# Patient Record
Sex: MALE | Race: WHITE | NOT HISPANIC OR LATINO | Employment: UNEMPLOYED | URBAN - METROPOLITAN AREA
[De-identification: names, ages, dates, MRNs, and addresses within clinical notes are randomized per-mention and may not be internally consistent; named-entity substitution may affect disease eponyms.]

---

## 2017-02-21 ENCOUNTER — COMMUNICATION - HEALTHEAST (OUTPATIENT)
Dept: FAMILY MEDICINE | Facility: CLINIC | Age: 52
End: 2017-02-21

## 2017-02-21 DIAGNOSIS — F41.1 GENERALIZED ANXIETY DISORDER: ICD-10-CM

## 2017-03-11 ENCOUNTER — COMMUNICATION - HEALTHEAST (OUTPATIENT)
Dept: FAMILY MEDICINE | Facility: CLINIC | Age: 52
End: 2017-03-11

## 2017-03-11 DIAGNOSIS — M54.2 NECK PAIN: ICD-10-CM

## 2017-03-20 ENCOUNTER — OFFICE VISIT - HEALTHEAST (OUTPATIENT)
Dept: FAMILY MEDICINE | Facility: CLINIC | Age: 52
End: 2017-03-20

## 2017-03-20 DIAGNOSIS — G43.909 MIGRAINE: ICD-10-CM

## 2017-03-20 DIAGNOSIS — F41.1 GENERALIZED ANXIETY DISORDER: ICD-10-CM

## 2017-03-20 DIAGNOSIS — M54.2 NECK PAIN: ICD-10-CM

## 2017-03-20 ASSESSMENT — MIFFLIN-ST. JEOR: SCORE: 1628.6

## 2017-03-27 ENCOUNTER — COMMUNICATION - HEALTHEAST (OUTPATIENT)
Dept: FAMILY MEDICINE | Facility: CLINIC | Age: 52
End: 2017-03-27

## 2017-03-27 DIAGNOSIS — F41.1 GENERALIZED ANXIETY DISORDER: ICD-10-CM

## 2017-04-17 ENCOUNTER — COMMUNICATION - HEALTHEAST (OUTPATIENT)
Dept: FAMILY MEDICINE | Facility: CLINIC | Age: 52
End: 2017-04-17

## 2017-04-17 DIAGNOSIS — F41.1 GENERALIZED ANXIETY DISORDER: ICD-10-CM

## 2017-04-17 DIAGNOSIS — R42 DIZZINESS AND GIDDINESS: ICD-10-CM

## 2017-05-01 ENCOUNTER — OFFICE VISIT - HEALTHEAST (OUTPATIENT)
Dept: FAMILY MEDICINE | Facility: CLINIC | Age: 52
End: 2017-05-01

## 2017-05-01 DIAGNOSIS — R42 DIZZINESS AND GIDDINESS: ICD-10-CM

## 2017-05-01 DIAGNOSIS — Z51.81 MEDICATION MONITORING ENCOUNTER: ICD-10-CM

## 2017-05-01 DIAGNOSIS — F41.1 GENERALIZED ANXIETY DISORDER: ICD-10-CM

## 2017-05-01 DIAGNOSIS — G43.909 MIGRAINE: ICD-10-CM

## 2017-05-01 DIAGNOSIS — Z13.6 SCREENING FOR CARDIOVASCULAR CONDITION: ICD-10-CM

## 2017-05-01 ASSESSMENT — MIFFLIN-ST. JEOR: SCORE: 1652.42

## 2017-05-02 LAB
FASTING STATUS PATIENT QL REPORTED: NO
HDLC SERPL-MCNC: 45 MG/DL
LDLC SERPL CALC-MCNC: 146 MG/DL

## 2017-05-03 ENCOUNTER — RECORDS - HEALTHEAST (OUTPATIENT)
Dept: ADMINISTRATIVE | Facility: OTHER | Age: 52
End: 2017-05-03

## 2017-05-10 ENCOUNTER — COMMUNICATION - HEALTHEAST (OUTPATIENT)
Dept: FAMILY MEDICINE | Facility: CLINIC | Age: 52
End: 2017-05-10

## 2017-05-25 ENCOUNTER — RECORDS - HEALTHEAST (OUTPATIENT)
Dept: ADMINISTRATIVE | Facility: OTHER | Age: 52
End: 2017-05-25

## 2017-05-26 ENCOUNTER — COMMUNICATION - HEALTHEAST (OUTPATIENT)
Dept: FAMILY MEDICINE | Facility: CLINIC | Age: 52
End: 2017-05-26

## 2017-05-26 DIAGNOSIS — G43.909 MIGRAINE HEADACHE: ICD-10-CM

## 2017-07-24 ENCOUNTER — RECORDS - HEALTHEAST (OUTPATIENT)
Dept: ADMINISTRATIVE | Facility: OTHER | Age: 52
End: 2017-07-24

## 2017-09-06 ENCOUNTER — COMMUNICATION - HEALTHEAST (OUTPATIENT)
Dept: FAMILY MEDICINE | Facility: CLINIC | Age: 52
End: 2017-09-06

## 2017-09-17 ENCOUNTER — COMMUNICATION - HEALTHEAST (OUTPATIENT)
Dept: FAMILY MEDICINE | Facility: CLINIC | Age: 52
End: 2017-09-17

## 2017-09-17 DIAGNOSIS — F41.1 GENERALIZED ANXIETY DISORDER: ICD-10-CM

## 2017-09-18 ENCOUNTER — RECORDS - HEALTHEAST (OUTPATIENT)
Dept: ADMINISTRATIVE | Facility: OTHER | Age: 52
End: 2017-09-18

## 2017-09-26 ENCOUNTER — COMMUNICATION - HEALTHEAST (OUTPATIENT)
Dept: FAMILY MEDICINE | Facility: CLINIC | Age: 52
End: 2017-09-26

## 2017-09-26 DIAGNOSIS — F41.1 GENERALIZED ANXIETY DISORDER: ICD-10-CM

## 2017-10-29 ENCOUNTER — COMMUNICATION - HEALTHEAST (OUTPATIENT)
Dept: FAMILY MEDICINE | Facility: CLINIC | Age: 52
End: 2017-10-29

## 2017-10-29 DIAGNOSIS — F41.1 GENERALIZED ANXIETY DISORDER: ICD-10-CM

## 2017-10-30 ENCOUNTER — RECORDS - HEALTHEAST (OUTPATIENT)
Dept: ADMINISTRATIVE | Facility: OTHER | Age: 52
End: 2017-10-30

## 2017-11-16 ENCOUNTER — OFFICE VISIT - HEALTHEAST (OUTPATIENT)
Dept: FAMILY MEDICINE | Facility: CLINIC | Age: 52
End: 2017-11-16

## 2017-11-16 DIAGNOSIS — M54.2 NECK PAIN: ICD-10-CM

## 2017-11-16 DIAGNOSIS — G43.909 MIGRAINE: ICD-10-CM

## 2017-11-16 DIAGNOSIS — G43.909 MIGRAINE HEADACHE: ICD-10-CM

## 2017-11-16 DIAGNOSIS — F17.200 TOBACCO USE DISORDER: ICD-10-CM

## 2017-11-16 DIAGNOSIS — F41.1 GENERALIZED ANXIETY DISORDER: ICD-10-CM

## 2017-11-16 DIAGNOSIS — Z76.0 MEDICATION REFILL: ICD-10-CM

## 2017-11-16 DIAGNOSIS — Z23 NEED FOR VACCINATION: ICD-10-CM

## 2017-11-16 DIAGNOSIS — Z79.899 CHRONIC PRESCRIPTION BENZODIAZEPINE USE: ICD-10-CM

## 2017-11-16 ASSESSMENT — MIFFLIN-ST. JEOR: SCORE: 1639.83

## 2017-12-13 ENCOUNTER — HOSPITAL ENCOUNTER (OUTPATIENT)
Dept: PHYSICAL MEDICINE AND REHAB | Facility: CLINIC | Age: 52
Discharge: HOME | End: 2017-12-13
Attending: PHYSICIAN ASSISTANT

## 2017-12-13 DIAGNOSIS — M54.2 NECK PAIN: ICD-10-CM

## 2017-12-13 DIAGNOSIS — M54.50 LUMBAR SPINE PAIN: ICD-10-CM

## 2017-12-13 DIAGNOSIS — M54.6 THORACIC SPINE PAIN: ICD-10-CM

## 2017-12-13 DIAGNOSIS — G43.909 MIGRAINE: ICD-10-CM

## 2018-01-05 ENCOUNTER — COMMUNICATION - HEALTHEAST (OUTPATIENT)
Dept: FAMILY MEDICINE | Facility: CLINIC | Age: 53
End: 2018-01-05

## 2018-01-05 DIAGNOSIS — F41.1 GENERALIZED ANXIETY DISORDER: ICD-10-CM

## 2018-03-01 ENCOUNTER — OFFICE VISIT - HEALTHEAST (OUTPATIENT)
Dept: FAMILY MEDICINE | Facility: CLINIC | Age: 53
End: 2018-03-01

## 2018-03-01 DIAGNOSIS — G43.909 MIGRAINE: ICD-10-CM

## 2018-03-01 DIAGNOSIS — M54.2 CERVICALGIA: ICD-10-CM

## 2018-03-01 DIAGNOSIS — R42 DIZZINESS AND GIDDINESS: ICD-10-CM

## 2018-03-01 DIAGNOSIS — F41.1 GENERALIZED ANXIETY DISORDER: ICD-10-CM

## 2018-03-01 ASSESSMENT — MIFFLIN-ST. JEOR: SCORE: 1647.88

## 2018-03-02 ENCOUNTER — COMMUNICATION - HEALTHEAST (OUTPATIENT)
Dept: FAMILY MEDICINE | Facility: CLINIC | Age: 53
End: 2018-03-02

## 2018-03-06 ENCOUNTER — COMMUNICATION - HEALTHEAST (OUTPATIENT)
Dept: FAMILY MEDICINE | Facility: CLINIC | Age: 53
End: 2018-03-06

## 2018-03-12 ENCOUNTER — COMMUNICATION - HEALTHEAST (OUTPATIENT)
Dept: FAMILY MEDICINE | Facility: CLINIC | Age: 53
End: 2018-03-12

## 2018-04-18 ENCOUNTER — RECORDS - HEALTHEAST (OUTPATIENT)
Dept: ADMINISTRATIVE | Facility: OTHER | Age: 53
End: 2018-04-18

## 2018-05-01 ENCOUNTER — OFFICE VISIT - HEALTHEAST (OUTPATIENT)
Dept: FAMILY MEDICINE | Facility: CLINIC | Age: 53
End: 2018-05-01

## 2018-05-01 DIAGNOSIS — M79.671 FOOT PAIN, BILATERAL: ICD-10-CM

## 2018-05-01 DIAGNOSIS — F41.1 GENERALIZED ANXIETY DISORDER: ICD-10-CM

## 2018-05-01 DIAGNOSIS — R42 DIZZINESS AND GIDDINESS: ICD-10-CM

## 2018-05-01 DIAGNOSIS — F32.9 MAJOR DEPRESSION: ICD-10-CM

## 2018-05-01 DIAGNOSIS — M79.672 FOOT PAIN, BILATERAL: ICD-10-CM

## 2018-05-01 DIAGNOSIS — Z79.899 CONTROLLED SUBSTANCE AGREEMENT SIGNED: ICD-10-CM

## 2018-05-01 ASSESSMENT — MIFFLIN-ST. JEOR: SCORE: 1623.5

## 2018-05-16 ENCOUNTER — COMMUNICATION - HEALTHEAST (OUTPATIENT)
Dept: FAMILY MEDICINE | Facility: CLINIC | Age: 53
End: 2018-05-16

## 2018-05-16 DIAGNOSIS — F41.1 GENERALIZED ANXIETY DISORDER: ICD-10-CM

## 2018-06-07 ENCOUNTER — COMMUNICATION - HEALTHEAST (OUTPATIENT)
Dept: FAMILY MEDICINE | Facility: CLINIC | Age: 53
End: 2018-06-07

## 2018-06-07 DIAGNOSIS — M54.2 NECK PAIN: ICD-10-CM

## 2018-06-27 ENCOUNTER — COMMUNICATION - HEALTHEAST (OUTPATIENT)
Dept: FAMILY MEDICINE | Facility: CLINIC | Age: 53
End: 2018-06-27

## 2018-06-27 DIAGNOSIS — R42 DIZZINESS AND GIDDINESS: ICD-10-CM

## 2018-07-07 ENCOUNTER — COMMUNICATION - HEALTHEAST (OUTPATIENT)
Dept: SCHEDULING | Facility: CLINIC | Age: 53
End: 2018-07-07

## 2018-07-13 ENCOUNTER — COMMUNICATION - HEALTHEAST (OUTPATIENT)
Dept: FAMILY MEDICINE | Facility: CLINIC | Age: 53
End: 2018-07-13

## 2018-07-25 ENCOUNTER — COMMUNICATION - HEALTHEAST (OUTPATIENT)
Dept: FAMILY MEDICINE | Facility: CLINIC | Age: 53
End: 2018-07-25

## 2018-07-25 DIAGNOSIS — G43.909 MIGRAINE: ICD-10-CM

## 2018-07-31 ENCOUNTER — OFFICE VISIT - HEALTHEAST (OUTPATIENT)
Dept: AUDIOLOGY | Facility: CLINIC | Age: 53
End: 2018-07-31

## 2018-07-31 ENCOUNTER — OFFICE VISIT - HEALTHEAST (OUTPATIENT)
Dept: OTOLARYNGOLOGY | Facility: CLINIC | Age: 53
End: 2018-07-31

## 2018-07-31 DIAGNOSIS — R42 DIZZINESS: ICD-10-CM

## 2018-07-31 DIAGNOSIS — H92.03 OTALGIA OF BOTH EARS: ICD-10-CM

## 2018-07-31 DIAGNOSIS — R42 DIZZINESS AND GIDDINESS: ICD-10-CM

## 2018-07-31 DIAGNOSIS — H90.3 SENSORINEURAL HEARING LOSS, ASYMMETRICAL: ICD-10-CM

## 2018-08-12 ENCOUNTER — COMMUNICATION - HEALTHEAST (OUTPATIENT)
Dept: FAMILY MEDICINE | Facility: CLINIC | Age: 53
End: 2018-08-12

## 2018-08-12 DIAGNOSIS — F41.1 GENERALIZED ANXIETY DISORDER: ICD-10-CM

## 2018-09-07 ENCOUNTER — OFFICE VISIT - HEALTHEAST (OUTPATIENT)
Dept: FAMILY MEDICINE | Facility: CLINIC | Age: 53
End: 2018-09-07

## 2018-09-07 DIAGNOSIS — F41.1 GENERALIZED ANXIETY DISORDER: ICD-10-CM

## 2018-09-07 DIAGNOSIS — H81.8X9 PERSISTENT POSTURAL-PERCEPTUAL DIZZINESS: ICD-10-CM

## 2018-09-07 DIAGNOSIS — F32.9 MAJOR DEPRESSION: ICD-10-CM

## 2018-09-07 ASSESSMENT — MIFFLIN-ST. JEOR: SCORE: 1618.96

## 2018-09-21 ENCOUNTER — COMMUNICATION - HEALTHEAST (OUTPATIENT)
Dept: ADMINISTRATIVE | Facility: CLINIC | Age: 53
End: 2018-09-21

## 2018-10-03 ENCOUNTER — OFFICE VISIT - HEALTHEAST (OUTPATIENT)
Dept: PODIATRY | Facility: CLINIC | Age: 53
End: 2018-10-03

## 2018-10-03 ENCOUNTER — AMBULATORY - HEALTHEAST (OUTPATIENT)
Dept: OTHER | Facility: CLINIC | Age: 53
End: 2018-10-03

## 2018-10-03 DIAGNOSIS — M21.6X1 PRONATION DEFORMITY OF BOTH FEET: ICD-10-CM

## 2018-10-03 DIAGNOSIS — M21.6X2 PRONATION DEFORMITY OF BOTH FEET: ICD-10-CM

## 2018-10-28 ENCOUNTER — COMMUNICATION - HEALTHEAST (OUTPATIENT)
Dept: FAMILY MEDICINE | Facility: CLINIC | Age: 53
End: 2018-10-28

## 2018-10-28 DIAGNOSIS — F41.1 GENERALIZED ANXIETY DISORDER: ICD-10-CM

## 2019-01-22 ENCOUNTER — COMMUNICATION - HEALTHEAST (OUTPATIENT)
Dept: FAMILY MEDICINE | Facility: CLINIC | Age: 54
End: 2019-01-22

## 2019-01-23 ENCOUNTER — COMMUNICATION - HEALTHEAST (OUTPATIENT)
Dept: FAMILY MEDICINE | Facility: CLINIC | Age: 54
End: 2019-01-23

## 2019-01-31 ENCOUNTER — COMMUNICATION - HEALTHEAST (OUTPATIENT)
Dept: FAMILY MEDICINE | Facility: CLINIC | Age: 54
End: 2019-01-31

## 2019-02-01 ENCOUNTER — COMMUNICATION - HEALTHEAST (OUTPATIENT)
Dept: FAMILY MEDICINE | Facility: CLINIC | Age: 54
End: 2019-02-01

## 2019-02-20 ENCOUNTER — COMMUNICATION - HEALTHEAST (OUTPATIENT)
Dept: FAMILY MEDICINE | Facility: CLINIC | Age: 54
End: 2019-02-20

## 2019-02-25 ENCOUNTER — COMMUNICATION - HEALTHEAST (OUTPATIENT)
Dept: FAMILY MEDICINE | Facility: CLINIC | Age: 54
End: 2019-02-25

## 2019-03-06 ENCOUNTER — COMMUNICATION - HEALTHEAST (OUTPATIENT)
Dept: FAMILY MEDICINE | Facility: CLINIC | Age: 54
End: 2019-03-06

## 2019-03-06 DIAGNOSIS — M54.2 NECK PAIN: ICD-10-CM

## 2019-03-22 ENCOUNTER — OFFICE VISIT - HEALTHEAST (OUTPATIENT)
Dept: FAMILY MEDICINE | Facility: CLINIC | Age: 54
End: 2019-03-22

## 2019-03-22 DIAGNOSIS — H81.8X9 PERSISTENT POSTURAL-PERCEPTUAL DIZZINESS: ICD-10-CM

## 2019-03-22 DIAGNOSIS — F41.1 GENERALIZED ANXIETY DISORDER: ICD-10-CM

## 2019-03-22 DIAGNOSIS — M54.2 NECK PAIN: ICD-10-CM

## 2019-03-22 DIAGNOSIS — F17.200 TOBACCO USE DISORDER: ICD-10-CM

## 2019-03-22 DIAGNOSIS — G43.909 MIGRAINE WITHOUT STATUS MIGRAINOSUS, NOT INTRACTABLE, UNSPECIFIED MIGRAINE TYPE: ICD-10-CM

## 2019-03-22 DIAGNOSIS — M25.511 CHRONIC RIGHT SHOULDER PAIN: ICD-10-CM

## 2019-03-22 DIAGNOSIS — G89.29 CHRONIC RIGHT SHOULDER PAIN: ICD-10-CM

## 2019-03-22 ASSESSMENT — MIFFLIN-ST. JEOR: SCORE: 1659.79

## 2019-03-24 ENCOUNTER — COMMUNICATION - HEALTHEAST (OUTPATIENT)
Dept: FAMILY MEDICINE | Facility: CLINIC | Age: 54
End: 2019-03-24

## 2019-03-24 DIAGNOSIS — F41.1 GENERALIZED ANXIETY DISORDER: ICD-10-CM

## 2019-05-31 ENCOUNTER — COMMUNICATION - HEALTHEAST (OUTPATIENT)
Dept: FAMILY MEDICINE | Facility: CLINIC | Age: 54
End: 2019-05-31

## 2019-08-08 ENCOUNTER — OFFICE VISIT - HEALTHEAST (OUTPATIENT)
Dept: FAMILY MEDICINE | Facility: CLINIC | Age: 54
End: 2019-08-08

## 2019-08-08 DIAGNOSIS — F10.11 HISTORY OF ALCOHOL ABUSE: ICD-10-CM

## 2019-08-08 DIAGNOSIS — H81.8X9 PERSISTENT POSTURAL-PERCEPTUAL DIZZINESS: ICD-10-CM

## 2019-08-08 DIAGNOSIS — F41.9 ANXIETY: ICD-10-CM

## 2019-08-08 ASSESSMENT — MIFFLIN-ST. JEOR: SCORE: 1682.47

## 2019-09-05 ENCOUNTER — COMMUNICATION - HEALTHEAST (OUTPATIENT)
Dept: FAMILY MEDICINE | Facility: CLINIC | Age: 54
End: 2019-09-05

## 2019-09-05 DIAGNOSIS — F41.1 GENERALIZED ANXIETY DISORDER: ICD-10-CM

## 2019-09-09 ENCOUNTER — OFFICE VISIT - HEALTHEAST (OUTPATIENT)
Dept: FAMILY MEDICINE | Facility: CLINIC | Age: 54
End: 2019-09-09

## 2019-09-09 DIAGNOSIS — F33.41 RECURRENT MAJOR DEPRESSIVE DISORDER, IN PARTIAL REMISSION (H): ICD-10-CM

## 2019-09-09 DIAGNOSIS — H81.8X9 PERSISTENT POSTURAL-PERCEPTUAL DIZZINESS: ICD-10-CM

## 2019-09-09 DIAGNOSIS — F41.1 GENERALIZED ANXIETY DISORDER: ICD-10-CM

## 2019-09-09 DIAGNOSIS — F10.21 ALCOHOL DEPENDENCE IN REMISSION (H): ICD-10-CM

## 2019-09-09 DIAGNOSIS — G43.909 MIGRAINE WITHOUT STATUS MIGRAINOSUS, NOT INTRACTABLE, UNSPECIFIED MIGRAINE TYPE: ICD-10-CM

## 2019-10-03 ENCOUNTER — COMMUNICATION - HEALTHEAST (OUTPATIENT)
Dept: FAMILY MEDICINE | Facility: CLINIC | Age: 54
End: 2019-10-03

## 2019-10-03 DIAGNOSIS — F41.1 GENERALIZED ANXIETY DISORDER: ICD-10-CM

## 2019-10-03 DIAGNOSIS — M54.2 NECK PAIN: ICD-10-CM

## 2019-10-03 DIAGNOSIS — H81.8X9 PERSISTENT POSTURAL-PERCEPTUAL DIZZINESS: ICD-10-CM

## 2019-11-26 ENCOUNTER — COMMUNICATION - HEALTHEAST (OUTPATIENT)
Dept: FAMILY MEDICINE | Facility: CLINIC | Age: 54
End: 2019-11-26

## 2019-11-26 DIAGNOSIS — F41.1 GENERALIZED ANXIETY DISORDER: ICD-10-CM

## 2019-12-10 ENCOUNTER — OFFICE VISIT - HEALTHEAST (OUTPATIENT)
Dept: FAMILY MEDICINE | Facility: CLINIC | Age: 54
End: 2019-12-10

## 2019-12-10 DIAGNOSIS — F41.1 GENERALIZED ANXIETY DISORDER: ICD-10-CM

## 2019-12-10 DIAGNOSIS — R41.3 MEMORY LOSS: ICD-10-CM

## 2019-12-10 DIAGNOSIS — G43.909 MIGRAINE WITHOUT STATUS MIGRAINOSUS, NOT INTRACTABLE, UNSPECIFIED MIGRAINE TYPE: ICD-10-CM

## 2019-12-10 DIAGNOSIS — H81.8X9 PERSISTENT POSTURAL-PERCEPTUAL DIZZINESS: ICD-10-CM

## 2019-12-10 ASSESSMENT — PATIENT HEALTH QUESTIONNAIRE - PHQ9: SUM OF ALL RESPONSES TO PHQ QUESTIONS 1-9: 6

## 2019-12-10 ASSESSMENT — MIFFLIN-ST. JEOR: SCORE: 1702.03

## 2019-12-17 ENCOUNTER — COMMUNICATION - HEALTHEAST (OUTPATIENT)
Dept: FAMILY MEDICINE | Facility: CLINIC | Age: 54
End: 2019-12-17

## 2020-01-23 ENCOUNTER — COMMUNICATION - HEALTHEAST (OUTPATIENT)
Dept: FAMILY MEDICINE | Facility: CLINIC | Age: 55
End: 2020-01-23

## 2020-01-23 DIAGNOSIS — F41.1 GENERALIZED ANXIETY DISORDER: ICD-10-CM

## 2020-01-23 DIAGNOSIS — H81.8X9 PERSISTENT POSTURAL-PERCEPTUAL DIZZINESS: ICD-10-CM

## 2020-02-03 ENCOUNTER — COMMUNICATION - HEALTHEAST (OUTPATIENT)
Dept: FAMILY MEDICINE | Facility: CLINIC | Age: 55
End: 2020-02-03

## 2020-02-03 DIAGNOSIS — H81.8X9 PERSISTENT POSTURAL-PERCEPTUAL DIZZINESS: ICD-10-CM

## 2020-02-03 DIAGNOSIS — F41.1 GENERALIZED ANXIETY DISORDER: ICD-10-CM

## 2020-05-18 ENCOUNTER — COMMUNICATION - HEALTHEAST (OUTPATIENT)
Dept: FAMILY MEDICINE | Facility: CLINIC | Age: 55
End: 2020-05-18

## 2020-05-18 DIAGNOSIS — F41.1 GENERALIZED ANXIETY DISORDER: ICD-10-CM

## 2020-06-17 ENCOUNTER — COMMUNICATION - HEALTHEAST (OUTPATIENT)
Dept: FAMILY MEDICINE | Facility: CLINIC | Age: 55
End: 2020-06-17

## 2020-06-17 DIAGNOSIS — M54.2 NECK PAIN: ICD-10-CM

## 2020-08-17 ENCOUNTER — COMMUNICATION - HEALTHEAST (OUTPATIENT)
Dept: FAMILY MEDICINE | Facility: CLINIC | Age: 55
End: 2020-08-17

## 2020-08-17 DIAGNOSIS — M54.2 NECK PAIN: ICD-10-CM

## 2020-09-01 ENCOUNTER — COMMUNICATION - HEALTHEAST (OUTPATIENT)
Dept: FAMILY MEDICINE | Facility: CLINIC | Age: 55
End: 2020-09-01

## 2020-09-01 DIAGNOSIS — F41.1 GENERALIZED ANXIETY DISORDER: ICD-10-CM

## 2020-10-24 ENCOUNTER — COMMUNICATION - HEALTHEAST (OUTPATIENT)
Dept: FAMILY MEDICINE | Facility: CLINIC | Age: 55
End: 2020-10-24

## 2020-10-24 DIAGNOSIS — M54.2 NECK PAIN: ICD-10-CM

## 2020-12-02 ENCOUNTER — COMMUNICATION - HEALTHEAST (OUTPATIENT)
Dept: FAMILY MEDICINE | Facility: CLINIC | Age: 55
End: 2020-12-02

## 2020-12-02 DIAGNOSIS — F41.1 GENERALIZED ANXIETY DISORDER: ICD-10-CM

## 2020-12-30 ENCOUNTER — COMMUNICATION - HEALTHEAST (OUTPATIENT)
Dept: FAMILY MEDICINE | Facility: CLINIC | Age: 55
End: 2020-12-30

## 2020-12-30 DIAGNOSIS — M54.2 NECK PAIN: ICD-10-CM

## 2021-03-04 ENCOUNTER — COMMUNICATION - HEALTHEAST (OUTPATIENT)
Dept: FAMILY MEDICINE | Facility: CLINIC | Age: 56
End: 2021-03-04

## 2021-03-04 DIAGNOSIS — F41.1 GENERALIZED ANXIETY DISORDER: ICD-10-CM

## 2021-03-12 ENCOUNTER — COMMUNICATION - HEALTHEAST (OUTPATIENT)
Dept: FAMILY MEDICINE | Facility: CLINIC | Age: 56
End: 2021-03-12

## 2021-03-12 DIAGNOSIS — M54.2 NECK PAIN: ICD-10-CM

## 2021-03-12 DIAGNOSIS — F41.1 GENERALIZED ANXIETY DISORDER: ICD-10-CM

## 2021-04-02 ENCOUNTER — COMMUNICATION - HEALTHEAST (OUTPATIENT)
Dept: FAMILY MEDICINE | Facility: CLINIC | Age: 56
End: 2021-04-02

## 2021-04-02 DIAGNOSIS — F41.1 GENERALIZED ANXIETY DISORDER: ICD-10-CM

## 2021-04-26 ENCOUNTER — OFFICE VISIT - HEALTHEAST (OUTPATIENT)
Dept: FAMILY MEDICINE | Facility: CLINIC | Age: 56
End: 2021-04-26

## 2021-04-26 DIAGNOSIS — Z13.1 SCREENING FOR DIABETES MELLITUS: ICD-10-CM

## 2021-04-26 DIAGNOSIS — F41.1 GENERALIZED ANXIETY DISORDER: ICD-10-CM

## 2021-04-26 DIAGNOSIS — G43.909 MIGRAINE WITHOUT STATUS MIGRAINOSUS, NOT INTRACTABLE, UNSPECIFIED MIGRAINE TYPE: ICD-10-CM

## 2021-04-26 DIAGNOSIS — Z13.6 SCREENING FOR CARDIOVASCULAR CONDITION: ICD-10-CM

## 2021-04-26 DIAGNOSIS — H81.8X9 PERSISTENT POSTURAL-PERCEPTUAL DIZZINESS: ICD-10-CM

## 2021-04-26 DIAGNOSIS — R42 DIZZINESS AND GIDDINESS: ICD-10-CM

## 2021-04-26 DIAGNOSIS — Z12.11 SCREEN FOR COLON CANCER: ICD-10-CM

## 2021-04-26 ASSESSMENT — ANXIETY QUESTIONNAIRES
1. FEELING NERVOUS, ANXIOUS, OR ON EDGE: MORE THAN HALF THE DAYS
IF YOU CHECKED OFF ANY PROBLEMS ON THIS QUESTIONNAIRE, HOW DIFFICULT HAVE THESE PROBLEMS MADE IT FOR YOU TO DO YOUR WORK, TAKE CARE OF THINGS AT HOME, OR GET ALONG WITH OTHER PEOPLE: SOMEWHAT DIFFICULT
3. WORRYING TOO MUCH ABOUT DIFFERENT THINGS: MORE THAN HALF THE DAYS
2. NOT BEING ABLE TO STOP OR CONTROL WORRYING: NEARLY EVERY DAY
5. BEING SO RESTLESS THAT IT IS HARD TO SIT STILL: NOT AT ALL
4. TROUBLE RELAXING: NOT AT ALL
GAD7 TOTAL SCORE: 7
7. FEELING AFRAID AS IF SOMETHING AWFUL MIGHT HAPPEN: NOT AT ALL
6. BECOMING EASILY ANNOYED OR IRRITABLE: NOT AT ALL

## 2021-04-26 ASSESSMENT — MIFFLIN-ST. JEOR: SCORE: 1682.47

## 2021-04-26 ASSESSMENT — PATIENT HEALTH QUESTIONNAIRE - PHQ9: SUM OF ALL RESPONSES TO PHQ QUESTIONS 1-9: 8

## 2021-05-13 ENCOUNTER — COMMUNICATION - HEALTHEAST (OUTPATIENT)
Dept: FAMILY MEDICINE | Facility: CLINIC | Age: 56
End: 2021-05-13

## 2021-05-13 DIAGNOSIS — F41.1 GENERALIZED ANXIETY DISORDER: ICD-10-CM

## 2021-05-14 RX ORDER — ESCITALOPRAM OXALATE 20 MG/1
20 TABLET ORAL DAILY
Qty: 90 TABLET | Refills: 3 | Status: SHIPPED | OUTPATIENT
Start: 2021-05-14 | End: 2022-05-20

## 2021-05-26 ASSESSMENT — PATIENT HEALTH QUESTIONNAIRE - PHQ9: SUM OF ALL RESPONSES TO PHQ QUESTIONS 1-9: 6
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
## 2021-05-27 ASSESSMENT — PATIENT HEALTH QUESTIONNAIRE - PHQ9: SUM OF ALL RESPONSES TO PHQ QUESTIONS 1-9: 8

## 2021-05-27 NOTE — TELEPHONE ENCOUNTER
Refill Approved    Rx renewed per Medication Renewal Policy. Medication was last renewed on 1/31/19.    Kasey Sanders, [facility] Triage/Med Refill 3/25/2019     Requested Prescriptions   Pending Prescriptions Disp Refills     hydrOXYzine HCl (ATARAX) 25 MG tablet [Pharmacy Med Name: HYDROXYZINE HCL 25MG TABS (WHITE)] 90 tablet 0     Sig: TAKE 1 TABLET BY MOUTH THREE TIMES DAILY AS NEEDED FOR ANXIETY    Antihistamine Refill Protocol Passed - 3/24/2019  3:59 PM       Passed - Patient has had office visit/physical in last year    Last office visit with prescriber/PCP: 3/22/2019 Priya Caal MD OR same dept: 3/22/2019 Priya Caal MD OR same specialty: 3/22/2019 Priya Caal MD  Last physical: Visit date not found Last MTM visit: Visit date not found   Next visit within 3 mo: Visit date not found  Next physical within 3 mo: Visit date not found  Prescriber OR PCP: Priya Caal MD  Last diagnosis associated with med order: There are no diagnoses linked to this encounter.  If protocol passes may refill for 12 months if within 3 months of last provider visit (or a total of 15 months).

## 2021-05-28 ASSESSMENT — ANXIETY QUESTIONNAIRES: GAD7 TOTAL SCORE: 7

## 2021-05-29 NOTE — TELEPHONE ENCOUNTER
New Appointment Needed  What is the reason for the visit:    Same Date/Next Day Appt Request  What is the reason for your visit?: Increase dizziness.    Provider Preference: PCP only  How soon do you need to be seen?: today, would like to use the 2:00 opening. Patients appointments are set for 40 min.  Waitlist offered?: No  Okay to leave a detailed message:  Yes

## 2021-05-30 VITALS — HEIGHT: 68 IN | WEIGHT: 180.13 LBS | BODY MASS INDEX: 27.3 KG/M2

## 2021-05-30 VITALS — WEIGHT: 185.38 LBS | HEIGHT: 68 IN | BODY MASS INDEX: 28.1 KG/M2

## 2021-05-31 ENCOUNTER — RECORDS - HEALTHEAST (OUTPATIENT)
Dept: ADMINISTRATIVE | Facility: CLINIC | Age: 56
End: 2021-05-31

## 2021-05-31 VITALS — WEIGHT: 174 LBS | BODY MASS INDEX: 26.85 KG/M2

## 2021-05-31 VITALS — BODY MASS INDEX: 27.68 KG/M2 | WEIGHT: 182.6 LBS | HEIGHT: 68 IN

## 2021-05-31 NOTE — PROGRESS NOTES
"Chief Complaint   Patient presents with     Paperwork     He states that he no longer qualifies for FMLA so he needs an intermittent leave of absence sheet filled out.      HPI: Patient presents today requesting to have paperwork filled out for intermittent leave.  He previously has had FMLA, but given that he is cut down his hours, he needs new paperwork filled out.  He has known history of persistent pustular perceptual dizziness diagnosed at the [facility].  For the symptoms he has had brain imaging, physical therapy, and I cognitive therapy.  He may be interested in going to the [facility].    Patient has a history of alcohol abuse and anxiety.  In the past he was using alprazolam 3 times daily dosing which helped to \" calm down his brain\".  When he went to treatment for alcohol abuse earlier this year they said he had to stop all of his benzodiazepines so he has been without for the last several months.  He says that this is difficult for him despite continuing with Lexapro and hydroxyzine.  He wonders if he can restart alprazolam.  He says he feels more anxious and more \"jumpy\".    ELISA-7 Total: 13 (9/7/2018  5:00 PM)  ELISA 7 Total Score: 4 (8/8/2019  4:00 PM)  PHQ-9 Total Score: 12 (8/8/2019  4:00 PM)          ROS:Review of Systems - negative except for what's listed in the HPI    SH: The Patient's  reports that he has been smoking cigarettes.  He has a 30.00 pack-year smoking history. He has never used smokeless tobacco. He reports that he does not drink alcohol or use drugs.      FH: The Patient's family history includes No Medical Problems in his brother, father, mother, and sister.     Meds:    Current Outpatient Medications on File Prior to Visit   Medication Sig Dispense Refill     escitalopram oxalate (LEXAPRO) 20 MG tablet Take 1 tablet (20 mg total) by mouth daily. 90 tablet 3     hydrOXYzine HCl (ATARAX) 25 MG tablet TAKE 1 TABLET BY MOUTH THREE TIMES DAILY AS NEEDED FOR ANXIETY 90 " "tablet 6     methocarbamol (ROBAXIN) 750 MG tablet Take 1 tablet (750 mg total) by mouth 3 (three) times a day as needed. 90 tablet 5     No current facility-administered medications on file prior to visit.        O:  /86   Pulse 72   Temp 98.5  F (36.9  C) (Oral)   Ht 5' 7.5\" (1.715 m)   Wt 192 lb (87.1 kg)   SpO2 97%   BMI 29.63 kg/m      Physical Examination:   General appearance - alert, well appearing, and in no distress  Mental status - alert, oriented to person, place, and time  Eyes - pupils equal and reactive, extraocular eye movements intact  Ears - bilateral TM's and external ear canals normal  Mouth - mucous membranes moist, pharynx normal without lesions  Neck - supple, no significant adenopathy  Lymphatics - no palpable lymphadenopathy, no hepatosplenomegaly  Chest - clear to auscultation, no wheezes, rales or rhonchi, symmetric air entry  Heart - normal rate and regular rhythm, S1 and S2 normal, no murmurs noted  Neurological - alert, oriented, normal speech, no focal findings or movement disorder noted, neck supple without rigidity, cranial nerves II through XII intact, motor and sensory grossly normal bilaterally, normal muscle tone, no tremors, strength 5/5  Extremities - peripheral pulses normal, no pedal edema, no clubbing or cyanosis  Skin - normal coloration and turgor, no rashes, no suspicious skin lesions noted      A/P:     Problem List Items Addressed This Visit     Persistent postural-perceptual dizziness - Primary      Other Visit Diagnoses     Anxiety        History of alcohol abuse                1. Persistent postural-perceptual dizziness  Paperwork completed today.    2. Anxiety  Discussed different treatment options for anxiety.  I am hesitant to restart high-dose alprazolam like he previously had been on.  We did discuss the possibility of psychiatric consultation and management.  He may be interested in following up with his PCP to discuss restarting.  Will defer " decision to that.    3. History of alcohol abuse  Congratulated on abstinence from alcohol.        Joe Yang, CNP

## 2021-05-31 NOTE — PATIENT INSTRUCTIONS - HE
We've faxed your paperwork out to the employer.    That appointment with Dr. Caal in scheduled in September to address re-starting the xanax.    Thank you for coming in today!    If you receive a survey from [employer] about your experience today, it would be very helpful if you could fill it out to let us know what went well and what we can improve!    General Information:    Today you had your appointment with Joe Yang NP    My hours are:    Monday : Out of clinic  Tuesday : 8:00AM - 5:00 PM  Wednesday: 8:00AM - 5:00 PM  Thursday: 8:00AM - 5:00 PM  Friday: 8:00AM - 5:00 PM    I am not in the office Mondays. Therefore non-urgent calls and medical messages received on Monday will be addressed when I am back in the office on Tuesday. Urgent matters will be reviewed and addressed by one of my partners in the office as needed.    If lab work was done today as part of your evaluation you will generally be contacted via impokt, mail, or phone with the results within 1-5 days. If there is an alarming result we will contact you by phone. Lab results come back at varying times, I generally wait until all lab results are available before making comments on the results.     If you need refills please contact your pharmacy. They will send a refill request to me to review. Please allow 3-5 business days for us to process all refill requests.     My Clinical Assistant is Maria Isabel. Please call us at [phone] or send a medical message with any questions or concerns.

## 2021-06-01 VITALS — HEIGHT: 68 IN | WEIGHT: 178 LBS | BODY MASS INDEX: 26.98 KG/M2

## 2021-06-01 VITALS — BODY MASS INDEX: 27.94 KG/M2 | WEIGHT: 184.38 LBS | HEIGHT: 68 IN

## 2021-06-01 VITALS — BODY MASS INDEX: 27.13 KG/M2 | WEIGHT: 179 LBS | HEIGHT: 68 IN

## 2021-06-01 NOTE — TELEPHONE ENCOUNTER
Refill Approved    Rx renewed per Medication Renewal Policy. Medication was last renewed on 8/13/2018.  Last OV 8/8/2019.    Laine Melgoza, [facility] Connection Triage/Med Refill 9/5/2019     Requested Prescriptions   Pending Prescriptions Disp Refills     escitalopram oxalate (LEXAPRO) 20 MG tablet [Pharmacy Med Name: ESCITALOPRAM 20MG TABLETS] 90 tablet 0     Sig: TAKE 1 TABLET(20 MG) BY MOUTH DAILY       SSRI Refill Protocol  Passed - 9/5/2019  3:26 AM        Passed - PCP or prescribing provider visit in last year     Last office visit with prescriber/PCP: 3/22/2019 Priya Caal MD OR same dept: 8/8/2019 Joe Yang CNP OR same specialty: 8/8/2019 Joe Yang CNP  Last physical: Visit date not found Last MTM visit: Visit date not found   Next visit within 3 mo: Visit date not found  Next physical within 3 mo: Visit date not found  Prescriber OR PCP: Priya Caal MD  Last diagnosis associated with med order: 1. Generalized anxiety disorder  - escitalopram oxalate (LEXAPRO) 20 MG tablet [Pharmacy Med Name: ESCITALOPRAM 20MG TABLETS]; TAKE 1 TABLET(20 MG) BY MOUTH DAILY  Dispense: 90 tablet; Refill: 0    If protocol passes may refill for 12 months if within 3 months of last provider visit (or a total of 15 months).

## 2021-06-01 NOTE — PROGRESS NOTES
OV  9/9/2019  Assessment:         1. Generalized anxiety disorder  clonazePAM (KLONOPIN) 0.5 MG tablet   2. Persistent postural-perceptual dizziness  clonazePAM (KLONOPIN) 0.5 MG tablet   3. Recurrent major depressive disorder, in partial remission (H)     4. Alcohol dependence in remission (H)     5. Migraine without status migrainosus, not intractable, unspecified migraine type            Plan:          We reviewed the etiology and natural history for depression/anxiety and options for treatment. We elected to begin a trial of intermittent clonazepam. We reviewed the potential side effects, and I explained the importance of dosing no more than a few times weekly, I imagine at the start of one of his dizzy episodes. We discussed the potential for abuse or harm from misuse from benzodiazepines in general especially with alcohol abuse history, and he expressed understanding. We will discuss a treatment agreement at his follow up visit in 1 month.He will otherwise continue his lexapro and methocarbamol as directed. He will continue with the hydroxyzine as needed as well. He will let me know if he has any significant problems or concerns. He should f/u in 1 mos for recheck, then every 4-6 mos following that.     Subjective:           Larry Denson is a 53 y.o. male who presents for follow up of anxiety, persistent postural-perceptual dizziness, migraine headaches and history of alcohol dependence. He had been on lexapro, topamax and alprazolam for years, but began using alcohol nightly in secret a couple years ago and ultimately went to detox, then inpatient, and outpatient treatment, and has been sober since. He has had to cut back his work hours since he completed treatment, working M-W-F to allow adequate rest between shifts. He takes hydroxyzine 25 mg three times a day for anxiety, but continues to have intermittent episodes of dizziness that can last 3-4 days. He's also been taking muscle relaxants for persistent  neck pain and continues to have headaches 3x weekly. Tylenol seems to help with the headaches at this time. He denies over panic attacks, but feels like he is always on edge, very jumpy and startles easily. He reports that he feels poorly in general and foggy frequently. He can't read or watch TV due to his ongoing dizziness. He wonders about getting back on a medication like alprazolam again, at least intermittently. He feels worse with increased physical activity, and does occasionally need to miss work or go in later due to the dizziness.          Current treatment for depression: Medication: escitalopram. He complains of the following side effects from the treatment: none.       The following portions of the patient's history were reviewed and updated as appropriate: allergies, current medications, past family history, past medical history, past social history, past surgical history and problem list.    Review of Systems  A 12 point comprehensive review of systems was negative except as noted.        Objective:     Vitals:    09/09/19 1458   BP: 106/74   Patient Position: Sitting   Cuff Size: Adult Large   Pulse: 82   SpO2: 97%   Weight: 196 lb 2 oz (89 kg)      Body mass index is 30.26 kg/m .   Physical Exam:  GEN: Alert and oriented, NAD,  well nourished  SKIN:  Normal skin turgor, no lesions/rashes   HEENT: moist mucous membranes, no rhinorrhea.    NECK: Normal.  No adenopathy or thyromegaly.  CV: Regular rate and rhythm, no murmurs.   LUNGS: Clear to auscultation bilaterally.    ABDOMEN: Soft, non-tender, non-distended, no masses   EXTREMITY: No edema, cyanosis  NEURO: Grossly normal.     Mental Status Examination:  Dress, grooming, personal hygiene: Appropriate  Speech: Appropriate  Mood: Appropriate

## 2021-06-02 ENCOUNTER — RECORDS - HEALTHEAST (OUTPATIENT)
Dept: ADMINISTRATIVE | Facility: CLINIC | Age: 56
End: 2021-06-02

## 2021-06-02 VITALS — HEIGHT: 68 IN | WEIGHT: 187 LBS | BODY MASS INDEX: 28.34 KG/M2

## 2021-06-02 NOTE — TELEPHONE ENCOUNTER
Controlled Substance Refill Request  Medication:   Requested Prescriptions     Pending Prescriptions Disp Refills     methocarbamol (ROBAXIN) 750 MG tablet [Pharmacy Med Name: METHOCARBAMOL 750MG TABLETS] 90 tablet 0     Sig: TAKE 1 TABLET(750 MG) BY MOUTH THREE TIMES DAILY AS NEEDED     clonazePAM (KLONOPIN) 0.5 MG tablet [Pharmacy Med Name: CLONAZEPAM 0.5MG TABLETS] 20 tablet 0     Sig: TAKE 1 TABLET(0.5 MG) BY MOUTH TWICE DAILY AS NEEDED FOR ANXIETY     Date Last Fill: 9/9/19  Pharmacy: [id number]   Submit electronically to pharmacy  Controlled Substance Agreement on File:   Encounter-Level CSA Scan Date - 05/01/2018:    Scan on 5/3/2018 11:48 AM: JOSE           Encounter-Level CSA Scan Date - 03/20/2017:    Scan on 3/20/2017: NICHO ESPINOSA       Last office visit: Last office visit pertaining to requested medication was 9/9/19.      Provider Refill Request  Medication:  robaxin  RN can NOT refill this medication per RN refill protocol because med is not covered by policy/route to provider

## 2021-06-03 ENCOUNTER — RECORDS - HEALTHEAST (OUTPATIENT)
Dept: ADMINISTRATIVE | Facility: CLINIC | Age: 56
End: 2021-06-03

## 2021-06-03 VITALS — HEIGHT: 68 IN | BODY MASS INDEX: 29.1 KG/M2 | WEIGHT: 192 LBS

## 2021-06-03 VITALS
BODY MASS INDEX: 30.26 KG/M2 | HEART RATE: 82 BPM | DIASTOLIC BLOOD PRESSURE: 74 MMHG | SYSTOLIC BLOOD PRESSURE: 106 MMHG | WEIGHT: 196.13 LBS | OXYGEN SATURATION: 97 %

## 2021-06-03 NOTE — TELEPHONE ENCOUNTER
Refill Approved    Rx renewed per Medication Renewal Policy. Medication was last renewed on 3/25/19.    Jessie Navarro, [facility] Connection Triage/Med Refill 11/27/2019     Requested Prescriptions   Pending Prescriptions Disp Refills     hydrOXYzine HCl (ATARAX) 25 MG tablet [Pharmacy Med Name: HYDROXYZINE HCL 25MG TABS (WHITE)] 90 tablet 0     Sig: TAKE 1 TABLET BY MOUTH THREE TIMES DAILY AS NEEDED FOR ANXIETY       Antihistamine Refill Protocol Passed - 11/26/2019  3:26 AM        Passed - Patient has had office visit/physical in last year     Last office visit with prescriber/PCP: 9/9/2019 Priya Caal MD OR same dept: 9/9/2019 Priya Caal MD OR same specialty: 9/9/2019 Priya Caal MD  Last physical: Visit date not found Last MTM visit: Visit date not found   Next visit within 3 mo: Visit date not found  Next physical within 3 mo: Visit date not found  Prescriber OR PCP: Priya Caal MD  Last diagnosis associated with med order: 1. Generalized anxiety disorder  - hydrOXYzine HCl (ATARAX) 25 MG tablet [Pharmacy Med Name: HYDROXYZINE HCL 25MG TABS (WHITE)]; TAKE 1 TABLET BY MOUTH THREE TIMES DAILY AS NEEDED FOR ANXIETY  Dispense: 90 tablet; Refill: 0    If protocol passes may refill for 12 months if within 3 months of last provider visit (or a total of 15 months).

## 2021-06-04 VITALS
OXYGEN SATURATION: 94 % | WEIGHT: 196.31 LBS | HEIGHT: 68 IN | SYSTOLIC BLOOD PRESSURE: 110 MMHG | BODY MASS INDEX: 29.75 KG/M2 | HEART RATE: 82 BPM | DIASTOLIC BLOOD PRESSURE: 70 MMHG

## 2021-06-04 NOTE — PROGRESS NOTES
OV  12/10/2019  Assessment:         1. Memory Lapses Or Loss  Ambulatory referral to Psychiatry   2. Generalized anxiety disorder  clonazePAM (KLONOPIN) 0.5 MG tablet    Ambulatory referral to Psychiatry   3. Persistent postural-perceptual dizziness  clonazePAM (KLONOPIN) 0.5 MG tablet    Ambulatory referral to Psychiatry   4. Migraine without status migrainosus, not intractable, unspecified migraine type            Plan:          We reviewed the etiology and natural history for depression/anxiety and options for treatment. We elected to continue lexapro and clonazepam for now, and I have placed a referral for psychiatry. Ultimately he would like to resume the alprazolam, and he understands the potential risks with that. We reviewed the potential side effects, need to taper the medications gradually, and indications for urgent evaluation. I encouraged him to consider re-evaluation with [facility], and he will consider that. He will let me know if he has any significant problems or concerns. He should f/u in 6 mos or sooner if any difficulties.        Subjective:           Larry Denson is a 54 y.o. male who presents for anxiety, persistent postural-perceptual dizziness, migraine headaches and history of alcohol dependence. He had been on lexapro, topamax and alprazolam for years, but began using alcohol nightly in secret a couple years ago and ultimately went to detox, then inpatient, and outpatient treatment, and has been sober since. He has had to cut back his work hours since he completed treatment, working M-W-F to allow adequate rest between shifts. He takes hydroxyzine 25 mg three times a day for anxiety, and we started a trial of clonazepam prn 3 mos ago, but continues to have intermittent episodes of dizziness that can last 3-4 days. The effect of the clonazepam lasts about 6 hours, and he finds it most helpful in the evening in calming him down. He hasn't found it very helpful during the day. He expresses  "frustration with the current regimen and would love to go back to the alprazolam three times a day on an ongoing basis, and would like to see psychiatrist to discuss that. He feels like he is in a constant fog and is unhappy with his quality of life at this time. He feels like he is ultrasensitive to stimuli due to the 3PD. He denies overt panic attacks, but feels like he is always on edge, very jumpy and startles easily. He can't read or watch TV due to his ongoing dizziness. He feels worse with increased physical activity, and does occasionally need to miss work or go in later due to the dizziness.         He's been taking muscle relaxants three times a day for persistent migraines and chronic neck pain, He continues to have headaches 3x weekly. Tylenol seems to help with the headaches at this time.        Current treatment for depression: Medication: escitalopram and clonazepam prn. He complains of the following side effects from the treatment: none.       The following portions of the patient's history were reviewed and updated as appropriate: allergies, current medications, past family history, past medical history, past social history, past surgical history and problem list.    Review of Systems  A 12 point comprehensive review of systems was negative except as noted.        Objective:     Vitals:    12/10/19 1453   BP: 110/70   Patient Position: Sitting   Cuff Size: Adult Regular   Pulse: 82   SpO2: 94%   Weight: 196 lb 5 oz (89 kg)   Height: 5' 7.5\" (1.715 m)      Body mass index is 30.29 kg/m .   Physical Exam:  GEN: Alert and oriented, NAD,  well nourished  SKIN:  Normal skin turgor, no lesions/rashes   HEENT: moist mucous membranes, no rhinorrhea.    NECK: Normal.  No adenopathy or thyromegaly.  CV: Regular rate and rhythm, no murmurs.   LUNGS: Clear to auscultation bilaterally.    ABDOMEN: Soft, non-tender, non-distended, no masses   EXTREMITY: No edema, cyanosis  NEURO: Grossly normal.     Mental Status " Examination:  Dress, grooming, personal hygiene: Appropriate  Speech: Appropriate  Mood: Appropriate

## 2021-06-04 NOTE — TELEPHONE ENCOUNTER
FYI - Status Update  Who is Calling: Femi from [facility]  Update: We are updating you that this patient is scheduled with Dr. Santiago in our [place] location on 1/16/19.  Okay to leave a detailed message?:  No return call needed

## 2021-06-05 ENCOUNTER — RECORDS - HEALTHEAST (OUTPATIENT)
Dept: FAMILY MEDICINE | Facility: CLINIC | Age: 56
End: 2021-06-05

## 2021-06-05 VITALS
BODY MASS INDEX: 29.1 KG/M2 | WEIGHT: 192 LBS | OXYGEN SATURATION: 98 % | SYSTOLIC BLOOD PRESSURE: 112 MMHG | DIASTOLIC BLOOD PRESSURE: 88 MMHG | HEART RATE: 81 BPM | HEIGHT: 68 IN

## 2021-06-05 DIAGNOSIS — G43.909 MIGRAINE: ICD-10-CM

## 2021-06-05 NOTE — TELEPHONE ENCOUNTER
Controlled Substance Refill Request  Medication Name:   Requested Prescriptions     Pending Prescriptions Disp Refills     clonazePAM (KLONOPIN) 0.5 MG tablet [Pharmacy Med Name: CLONAZEPAM 0.5MG TABLETS] 20 tablet 0     Sig: TAKE 1 TABLET(0.5 MG) BY MOUTH TWICE DAILY AS NEEDED FOR ANXIETY     Date Last Fill: 12/10/19  Requested Pharmacy: [facility]  Submit electronically to pharmacy  Controlled Substance Agreement on file:   Encounter-Level CSA Scan Date - 05/01/2018:    Scan on 5/3/2018 11:48 AM: JOSE           Encounter-Level CSA Scan Date - 03/20/2017:    Scan on 3/20/2017: NICHO BOWLES AGMT        Last office visit:  9/9/19

## 2021-06-08 NOTE — TELEPHONE ENCOUNTER
Refill Approved    Rx renewed per Medication Renewal Policy. Medication was last renewed on 11/27/19.    Kasey Sanders, [facility] Triage/Med Refill 5/20/2020     Requested Prescriptions   Pending Prescriptions Disp Refills     hydrOXYzine HCL (ATARAX) 25 MG tablet [Pharmacy Med Name: HYDROXYZINE HCL 25MG TABS (WHITE)] 90 tablet 3     Sig: TAKE 1 TABLET(25 MG) BY MOUTH THREE TIMES DAILY AS NEEDED FOR ANXIETY       Antihistamine Refill Protocol Passed - 5/18/2020  7:22 AM        Passed - Patient has had office visit/physical in last year     Last office visit with prescriber/PCP: 12/10/2019 Priya Caal MD OR same dept: 12/10/2019 Priya Caal MD OR same specialty: 12/10/2019 Priya Caal MD  Last physical: Visit date not found Last MTM visit: Visit date not found   Next visit within 3 mo: Visit date not found  Next physical within 3 mo: Visit date not found  Prescriber OR PCP: Priya Caal MD  Last diagnosis associated with med order: 1. Generalized anxiety disorder  - hydrOXYzine HCL (ATARAX) 25 MG tablet [Pharmacy Med Name: HYDROXYZINE HCL 25MG TABS (WHITE)]; TAKE 1 TABLET(25 MG) BY MOUTH THREE TIMES DAILY AS NEEDED FOR ANXIETY  Dispense: 90 tablet; Refill: 3    If protocol passes may refill for 12 months if within 3 months of last provider visit (or a total of 15 months).

## 2021-06-08 NOTE — TELEPHONE ENCOUNTER
RN cannot approve Refill Request    RN can NOT refill this medication med is not covered by policy/route to provider     . Last office visit: 12/10/2019 Priya Caal MD Last Physical: Visit date not found Last MTM visit: Visit date not found Last visit same specialty: 12/10/2019 Priya Caal MD.  Next visit within 3 mo: Visit date not found  Next physical within 3 mo: Visit date not found      Kasey Sanders, [facility] Triage/Med Refill 6/17/2020    Requested Prescriptions   Pending Prescriptions Disp Refills     methocarbamoL (ROBAXIN) 750 MG tablet [Pharmacy Med Name: METHOCARBAMOL 750MG TABLETS] 90 tablet 5     Sig: TAKE 1 TABLET(750 MG) BY MOUTH THREE TIMES DAILY AS NEEDED       There is no refill protocol information for this order

## 2021-06-09 NOTE — PROGRESS NOTES
OV  3/20/2017  Assessment:       1. Generalized anxiety disorder  escitalopram oxalate (LEXAPRO) 10 MG tablet    ALPRAZolam (XANAX) 1 MG tablet   2. Migraine  topiramate (TOPAMAX) 50 MG tablet    topiramate (TOPAMAX) 25 MG tablet   3. Neck pain        Plan:        We reviewed the etiology and natural history for depression/anxiety and options for treatment. We elected to continue escitalopram, alprazolam and we discussed indications for adjusting the dose. We reviewed the potential side effects, need to taper the medications gradually, and indications for urgent evaluation. As far as the topamax, we discussed trying to increase the dose further and he will work on that as tolerated. He will consider a trial of a different benzodiazepine vs slowly decreasing the alprazolam and let me know if he's interested in proceeding. He will let me know if he has any significant side effects or concerns. Should f/u in 6-8 mos for med check.      Subjective:           Larry Denson is a 51 y.o. male who presents for follow up of anxiety disorder. He has the following anxiety symptoms: dizziness, fatigue and irritable. Onset of symptoms was approximately several years ago. Symptoms have beengradually improving since that time. Current symptoms include mild depressed mood, anhedonia, feelings of worthlessness, and fatigue, and moderate difficulty concentrating. Patient denies hopelessness, insomnia and recurrent thoughts of death and feelings of losing control and panic attacks. He denies current suicidal and homicidal ideation.      Current treatment includes Lexapro and alprazolam tid. He complains of the following medication side effects: none. He takes topamax for migraine prevention and denies any side effects from that. He was advised to try to taper the alprazolam when he was seen at [place], but has had difficulty tolerating any taper.        A new treatment agreement was completed for the xanax today as well.      Little  "interest or pleasure in doing things: Several days  Feeling down, depressed, or hopeless: Several days  Trouble falling or staying asleep, or sleeping too much: Not at all  Feeling tired or having little energy: Several days  Poor appetite or overeating: Not at all  Feeling bad about yourself - or that you are a failure or have let yourself or your family down: Several days  Trouble concentrating on things, such as reading the newspaper or watching television: More than half the days  Moving or speaking so slowly that other people could have noticed. Or the opposite - being so fidgety or restless that you have been moving around a lot more than usual: Not at all  Thoughts that you would be better off dead, or of hurting yourself in some way: Not at all  PHQ-9 Total Score: 6  If you checked off any problems, how difficult have these problems made it for you to do your work, take care of things at home, or get along with other people?: Not difficult at all      The following portions of the patient's history were reviewed and updated as appropriate: allergies, current medications, past family history, past medical history, past social history, past surgical history and problem list.    Review of Systems  Pertinent items are noted in HPI.      Objective:     Visit Vitals     /72     Pulse 60     Ht 5' 7.5\" (1.715 m)     Wt 180 lb 2 oz (81.7 kg)     BMI 27.8 kg/m2     Physical Exam:  GEN: Alert and oriented, NAD,  well nourished  SKIN:  Normal skin turgor, no lesions/rashes   HEENT: moist mucous membranes, no rhinorrhea.    NECK: Normal.  No adenopathy or thyromegaly.  CV: Regular rate and rhythm, no murmurs.   LUNGS: Clear to auscultation bilaterally.    ABDOMEN: Soft, non-tender, non-distended, no masses   EXTREMITY: No edema, cyanosis  NEURO: Grossly normal.     Mental Status Examination:  Dress, grooming, personal hygiene: Appropriate  Speech: Appropriate  Mood: Appropriate        "

## 2021-06-10 NOTE — TELEPHONE ENCOUNTER
Last Med Check: 12/10/2019    Next med check due on: ?      Future Appointment Scheduled ? no    Last Med Refill? 6/17/2020    Rodo Wright MA

## 2021-06-10 NOTE — PROGRESS NOTES
Assessment:     1. Generalized anxiety disorder     2. Dizziness  Comprehensive Metabolic Panel    Thyroid [place]   3. Migraine     4. Screening for cardiovascular condition  LDL Cholesterol, Direct    HDL Cholesterol   5. Medication monitoring encounter  HM2(CBC w/o Differential)         Plan:       We reviewed the potential etiologies for his evening symptoms and he will continue monitoring those symptoms and will work on trying to go into the house/bedroom before the symptoms get bad. He will be seeing [facility] soon. He will continue his same medications, and we did discuss some of the side effects of topamax, but I would expect that his symptoms would be daytime also. He will try pushing the dose to 50 mg bid as tolerated and let me know if he sees a pattern at all. He will plan to follow up in 4-6 mos otherwise.       Subjective:             Larry Denson is a 51 y.o. male who presents for follow up of anxiety disorder and chronic dizziness. Onset of symptoms was several years ago. Anxiety symptoms have been stable for the last several mos, but his dizziness has been a bit worse. He's planning to go to [facility] for evaluation again.  He denies any side effects from his medications.       Current anxiety/mood symptoms include loss of energy/fatigue, irritability, anhedonia, depressed mood and feelings of worthlessness/guilt. He denies current suicidal and homicidal ideation. Current treatment includes Lexapro which we increased last month and Xanax which he is working on tapering down. He complains of the following medication side effects: none. He is interested in seeing a psychologist again.         His wife is here with him today and they reports that he has difficulty with reading, watching TV, etc. He has been unable to stay up late or watch TV for more than 2 hrs or so as he crashes and is difficult to wake up to get into bed. He gets memory lapses, confusion, and has  "had falls when he gets overtired. Late night symptoms have been an issue for  A few mos now. He denies any significant daytime issues with memory and confusion. He's been trying to move around a bit more in the evenings, and wonders if he's having sensory overload.     The following portions of the patient's history were reviewed and updated as appropriate: allergies, current medications, past family history, past medical history, past social history, past surgical history and problem list.    Review of Systems  Pertinent items are noted in HPI.        Objective:        /72  Pulse 64  Ht 5' 7.5\" (1.715 m)  Wt 185 lb 6 oz (84.1 kg)  BMI 28.61 kg/m2  General     Alert, cooperative, no distress   Head:    Normocephalic, without obvious abnormality, atraumatic   Eyes:    PERRL, conjunctiva/corneas clear, EOM's intact   Ears:    Normal TM's and external ear canals, both ears   Nose:   Nares normal, mucosa normal, no drainage or sinus tenderness   Throat:   Oropharynx is clear with moist mucous membranes.    Neck:   Supple, no adenopathy and supple, symmetrical, trachea midline    Lungs:     clear to auscultation bilaterally   Heart:    Regular rate and rhythm, no murmur, rub or gallop   Abdomen:     Soft, non-tender, no masses   Skin:   Skin color, texture, turgor normal, no rashes or lesions      "

## 2021-06-11 NOTE — TELEPHONE ENCOUNTER
Refill Approved    Rx renewed per Medication Renewal Policy. Medication was last renewed on 9/5/19.    Kasey Sanders, [facility] Triage/Med Refill 9/3/2020     Requested Prescriptions   Pending Prescriptions Disp Refills     escitalopram oxalate (LEXAPRO) 20 MG tablet [Pharmacy Med Name: ESCITALOPRAM 20MG TABLETS] 90 tablet 3     Sig: TAKE 1 TABLET(20 MG) BY MOUTH DAILY       SSRI Refill Protocol  Passed - 9/1/2020  3:28 AM        Passed - PCP or prescribing provider visit in last year     Last office visit with prescriber/PCP: 12/10/2019 Priya Caal MD OR same dept: 12/10/2019 Priya Caal MD OR same specialty: 12/10/2019 Priya Caal MD  Last physical: Visit date not found Last MTM visit: Visit date not found   Next visit within 3 mo: Visit date not found  Next physical within 3 mo: Visit date not found  Prescriber OR PCP: Priya Caal MD  Last diagnosis associated with med order: 1. Generalized anxiety disorder  - escitalopram oxalate (LEXAPRO) 20 MG tablet [Pharmacy Med Name: ESCITALOPRAM 20MG TABLETS]; TAKE 1 TABLET(20 MG) BY MOUTH DAILY  Dispense: 90 tablet; Refill: 3    If protocol passes may refill for 12 months if within 3 months of last provider visit (or a total of 15 months).

## 2021-06-12 NOTE — TELEPHONE ENCOUNTER
RN cannot approve Refill Request    RN can NOT refill this medication Protocol failed and NO refill given. Last office visit: 12/10/2019 Priya Caal MD Last Physical: Visit date not found Last MTM visit: Visit date not found Last visit same specialty: 12/10/2019 Priya Caal MD.  Next visit within 3 mo: Visit date not found  Next physical within 3 mo: Visit date not found      Ling Tatum, [facility] Triage/Med Refill 10/24/2020    Requested Prescriptions   Pending Prescriptions Disp Refills     methocarbamoL (ROBAXIN) 750 MG tablet 90 tablet 1       There is no refill protocol information for this order

## 2021-06-13 NOTE — TELEPHONE ENCOUNTER
Due to be seen    Rx renewed per Medication Renewal Policy. Medication was last renewed on 9/3/20.    Kasey Sanders, [facility] Triage/Med Refill 12/3/2020     Requested Prescriptions   Pending Prescriptions Disp Refills     escitalopram oxalate (LEXAPRO) 20 MG tablet 90 tablet 0     Sig: Take 1 tablet (20 mg total) by mouth daily.       SSRI Refill Protocol  Passed - 12/2/2020  8:46 AM        Passed - PCP or prescribing provider visit in last year     Last office visit with prescriber/PCP: 12/10/2019 Priya Caal MD OR same dept: 12/10/2019 Priya Caal MD OR same specialty: 12/10/2019 Priya Caal MD  Last physical: Visit date not found Last MTM visit: Visit date not found   Next visit within 3 mo: Visit date not found  Next physical within 3 mo: Visit date not found  Prescriber OR PCP: Priya Caal MD  Last diagnosis associated with med order: 1. Generalized anxiety disorder  - escitalopram oxalate (LEXAPRO) 20 MG tablet; Take 1 tablet (20 mg total) by mouth daily.  Dispense: 90 tablet; Refill: 0    If protocol passes may refill for 12 months if within 3 months of last provider visit (or a total of 15 months).

## 2021-06-14 NOTE — PROGRESS NOTES
1. Medication refill     2. Migraine  topiramate (TOPAMAX) 50 MG tablet   3. Nicotine Dependence     4. Generalized anxiety disorder  escitalopram oxalate (LEXAPRO) 20 MG tablet   5. Migraine headache  topiramate (TOPAMAX) 25 MG tablet   6. Neck pain  methocarbamol (ROBAXIN) 500 MG tablet   7. Chronic prescription benzodiazepine use  Drugs of Abuse 1,Urine   8. Need for vaccination  Tdap vaccine,  8yo or older,  IM       The ASCVD Risk score (Tyler BASILIA Jr, et al., 2013) failed to calculate for the following reasons:    Cannot find a previous total cholesterol lab     Med list reconciled    ASSESSMENT/PLAN:     The following are part of a depression follow up plan for the patient:  coping support assessment, emotional support assessment and mental health care assessment    1. Medication refill      2. Migraine    - topiramate (TOPAMAX) 50 MG tablet; Take 1 tablet (50 mg total) by mouth at bedtime. Increase to 50 mg PO bid as directed/tolerated.  Dispense: 90 tablet; Refill: 3    3. Nicotine Dependence    -declined smoking cessation program today    4. Generalized anxiety disorder    - escitalopram oxalate (LEXAPRO) 20 MG tablet; Take 1 tablet (20 mg total) by mouth daily.  Dispense: 90 tablet; Refill: 1    5. Migraine headache    - topiramate (TOPAMAX) 25 MG tablet; Take 1 tablet (25 mg total) by mouth daily.  Dispense: 90 tablet; Refill: 1    6. Neck pain    - methocarbamol (ROBAXIN) 500 MG tablet; Take 1 tablet (500 mg total) by mouth 3 (three) times a day.  Dispense: 90 tablet; Refill: 5    7. Chronic prescription benzodiazepine use    - Drugs of Abuse 1,Urine    8. Need for vaccination    - Tdap vaccine,  8yo or older,  IM    There are no Patient Instructions on file for this visit.  Medications Discontinued During This Encounter   Medication Reason     escitalopram oxalate (LEXAPRO) 10 MG tablet Dose adjustment     topiramate (TOPAMAX) 50 MG tablet Reorder     topiramate (TOPAMAX) 25 MG tablet Reorder      "methocarbamol (ROBAXIN) 500 MG tablet Reorder     Return to clinic in 4 weeks for anxiety follow-up since increasing Lexapro dose to 20 mg.  [facility] website reviewed, patient shows compliance with his controlled substance use.  Urine drug screen pending.  The visit lasted a total of 25 minutes face to face with the patient.  Over 50% of the time spent counseling and educating the patient about above content.      Jenny Walton NP          SUBJECTIVE:  Larry Denson is a 52 y.o. male who presents for medication refill of multiple medications today.  Reviewed med list.  Patient is taking Xanax 1 mg 3 times a day for his generalized anxiety disorder.  He is also taking Lexapro 15 mg daily. Discussed his doses and need for attempting to wean down on his Xanax. Explained to patient that he is on a rather large dose, therefore, he did agree to an increase in his Lexapro dosing today.  Onset of his anxiety started in 2007 when he was at the job site working.  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  His heart began racing and he did experience mild shortness of breath.  He told his boss about the episode and he was sent to [facility] for evaluation and transferred to [facility] for additional testing.  He was placed on Xanax in 2007 by Dr. Ramon Scanlon because of his ongoing anxiety that was becoming increasingly problematic for him and members of his family. He is  and has children. His wife and children tell him that he is irritable and he is aware of this. He does get frustrated and feels as if he is in a \"brain fog\" at times. He continues to experience dizziness, numbness in his fingers and palpitations during his acute panic attacks.  In 2011 while working at the [employer], 1 of his coworkers fell off a roof and perished. Patient was off of work that " "day and he feels responsible for this individual's death.  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 He continues to carry guilt due to his co-workers death which ultimately led to him to quit his construction job.  He now works full-time working at the [employer] and suffers from chronic migraines with aura several times per week.  He does take Topamax twice a day, 25 mg in the daytime and 50 mg at night for migraine prevention.  He continues to smoke 1 pack a day, he denies alcohol or illicit drug use.  He is due for his Tdap vaccine today which he did agree to.  He did decline influenza vaccination. He is not interested in quitting smoking at this time.   Chief Complaint   Patient presents with     Follow-up     Med Check -          Patient Active Problem List   Diagnosis     Fatigue     Right Rotator Cuff Tendonitis     Cubital Tunnel Syndrome     Cervicalgia     Nicotine Dependence     Generalized Anxiety Disorder     Hearing Loss     Dizziness     Memory Lapses Or Loss     Migraine Headache     Controlled substance agreement signed       Current Outpatient Prescriptions   Medication Sig Dispense Refill     ALPRAZolam (XANAX) 1 MG tablet TAKE 1 TABLET BY MOUTH THREE TIMES DAILY AS NEEDED 90 tablet 1     methocarbamol (ROBAXIN) 500 MG tablet Take 1 tablet (500 mg total) by mouth 3 (three) times a day. 90 tablet 5     topiramate (TOPAMAX) 25 MG tablet Take 1 tablet (25 mg total) by mouth daily. 90 tablet 1     topiramate (TOPAMAX) 50 MG tablet Take 1 tablet (50 mg total) by mouth at bedtime. Increase to 50 mg PO bid as directed/tolerated. 90 tablet 3     escitalopram oxalate (LEXAPRO) 20 MG tablet Take 1 tablet (20 mg total) by mouth daily. 90 tablet 1     No current facility-administered medications for this visit.        History   Smoking Status     Current Every Day Smoker     Packs/day: 1.00     Years: 15.00     Types: Cigarettes "   Smokeless Tobacco     Never Used       REVIEW OF SYSTEMS: Excessive worries, restlessness, on edge, easily fatigues, irritability, disturbed sleep, decreased concentration, muscle tension, fear of health.      TOBACCO USE:  History   Smoking Status     Current Every Day Smoker     Packs/day: 1.00     Years: 15.00     Types: Cigarettes   Smokeless Tobacco     Never Used     Social History     Social History     Marital status:      Spouse name: N/A     Number of children: N/A     Years of education: N/A     Occupational History     Not on file.     Social History Main Topics     Smoking status: Current Every Day Smoker     Packs/day: 1.00     Years: 15.00     Types: Cigarettes     Smokeless tobacco: Never Used     Alcohol use No     Drug use: No     Sexual activity: Not Currently     Partners: Female     Other Topics Concern     Not on file     Social History Narrative         OBJECTIVE:            Vitals:    11/16/17 1620   BP: 114/78   Pulse: 66   Resp: 16   Temp: 98.4  F (36.9  C)   SpO2: 98%     Weight: 182 lb 9.6 oz (82.8 kg)  Wt Readings from Last 3 Encounters:   11/16/17 182 lb 9.6 oz (82.8 kg)   05/01/17 185 lb 6 oz (84.1 kg)   03/20/17 180 lb 2 oz (81.7 kg)     Body mass index is 28.18 kg/(m^2).        Physical Exam:  GENERAL APPEARANCE: A&A, NAD, well hydrated, well nourished  NECK: Supple, without lymphadenopathy, no thyroid mass, no JVD, no bruit  CV: RRR, no M/G/R   LUNGS: CTAB, normal respiratory effort  ABDOMEN: S&NT, no masses, no organomegaly, BS present x4   SKIN:  Normal skin turgor, no lesions/rashes, warm and dry   PSYCHIATRIC;  Mood appropriate, memory intact, flat affect, good eye contact, sad

## 2021-06-14 NOTE — PROGRESS NOTES
Assessment:   Larry Denson is a 52 y.o. y.o. male with past medical history significant for generalized anxiety disorder dizziness, migraine who presents today for follow-up regarding chronic neck pain and headaches, mid back pain, and lower back pain without radicular symptoms.  The patient has not had any recent advanced imaging of the spine.  An MRI of the cervical spine from 2012 did show relatively severe bilateral foraminal stenosis at C3-4, mild foraminal stenosis at other levels.  The patient is neurologically intact today.       Plan:     A shared decision making plan was used.  The patient's values and choices were respected.  The following represents what was discussed and decided upon by the physician assistant and the patient.      1.  DIAGNOSTIC TESTS: No diagnostic tests were ordered.  I offered for the patient's try physical therapy including craniosacral therapy    2.  PHYSICAL THERAPY: For his chronic neck pain and headaches.  He declined.  He has had physical therapy in the past.    3.  MEDICATIONS:    -Gabapentin 100 mg as prescribed.  He was given a dosage titration chart.  He may increase his dose up to a maximum of 300 mg 3 times daily.  The patient try this medication once in 2015.  He only took it for several days and stopped taking it because of dizziness.  The patient would like to try the medication again.  -The patient can continue using methocarbamol 500 mg twice daily.  -The patient also uses Topamax daily.      4.  INTERVENTIONS:  No interventions were ordered.    5.  PATIENT EDUCATION: The patient was in agreement with the above plan.  All questions were answered.  -The patient states that which is chronic pain his mood has declined.  He states that he sees a therapist on a regular basis.    6.  FOLLOW-UP: The patient follow-up with me as needed.  If he has any questions or concerns, he should not hesitate to call.    Subjective:     Larry Denson is a 52 y.o. male who presents  "today for follow-up regarding chronic neck pain and headaches as well as chronic thoracic spine pain and lumbar pain.  I last saw the patient on October 3, 2016.  At that time he requested an updated x-rays of the spine.  He had scoliosis x-rays which revealed a mild midthoracic dextrocurvature measuring 10  and lumbar levocurvature measuring approximately 15 .  He did not follow-up after that.  The patient states that he returns today because he is trying to check in with all of his healthcare providers as it is the end of the year.     He is most concerned about his ongoing headaches.  The patient describes headaches involving the left side of his head.  He states that they have been more frequent over the past 3 weeks.  They have also been slightly more severe.  The patient states that when he has the headaches he has pain in the ear and left jaw.  He also feels a numbness in the left cheek.  He continues to have bilateral posterior neck pain, midline midthoracic pain, and midline lower back pain.  He denies any pain radiating into the arms or down the legs.  He denies any numbness, tingling, or weakness down the arms or legs.  The patient takes Topamax for his migraines but wonders if there might be other medications to help.      The patient has had physical therapy in the past.  He does do some home stretching exercises.  He also used to go to a chiropractor but now he states that they \"do not want attention.\"  He uses Topamax 50 mg at bedtime and 25 mg at noon.  He also uses methocarbamol 500 mg twice daily.    Past medical history is reviewed and is pertinent for working with eye doctor and doing my physical therapy to work on tracking and focusing.  [facility] since he was last seen for his chronic dizziness.    Family history is reviewed and is unchanged in the interim.    Review of Systems:  Positive for dizziness, anxiety, poor sleep quality, back pain, joint pain, " headache, depression/worry.  Negative for weakness, numbness/tingling, footdrop, loss of bowel/bladder control, nausea/vomiting, blurry vision.     Objective:   CONSTITUTIONAL:  Vital signs as above.  No acute distress.  The patient is well nourished and well groomed.    PSYCHIATRIC:  The patient is awake, alert, oriented to person, place and time.  The patient is answering questions appropriately with clear speech.  Normal affect.  HEENT: Normocephalic, atraumatic.  Sclera clear.  Neck is supple.  SKIN:  Skin over the face, neck bilateral upper extremities is clean, dry, intact without rashes.  MUSCULOSKELETAL: The patient has 5/5 strength for the bilateral shoulder abductors, elbow flexors/extensors, wrist extensors, finger flexors/abductors, hip flexors, knee flexors/extensors, ankle dorsi/plantar flexors.   NEUROLOGICAL: Sensation to light touch is intact over bilateral upper and lower extremities throughout.     RESULTS:    XR SCOLIOSIS AP OR PA AND LATERAL STANDING  10/13/2016 5:45 PM     INDICATION: Neck pain. Chronic right-sided thoracic and low back pain.  COMPARISON: None.     FINDINGS: Suggestion of 6 nonrib-bearing lumbar type vertebra and 12 rib-bearing thoracic type vertebra. For the purposes of this report the segment at the lumbosacral junction will be considered a transitional lumbarized S1. Mild anterior vertebral   height loss at T12. Mild multilevel thoracic spondylosis. Thoracic kyphosis measures approximately 65 degrees. Lumbar lordosis when measured from the superior endplate of L1 through the inferior endplate of the transitional S1 segment measures 65   degrees. Sagittal balance is 1.5 cm negative relative to the transitional S1 segment, but neutral relative to the first nonmobile sacral segment. S-shaped thoracolumbar curvature, with mild midthoracic dextrocurvature measuring 10 degrees and lumbar   levocurvature measuring approximately 15 degrees coronal balance is approximately 1.8 cm to  the left of the central sacral line. No significant ancillary findings identified.

## 2021-06-15 PROBLEM — Z79.899 CONTROLLED SUBSTANCE AGREEMENT SIGNED: Status: ACTIVE | Noted: 2018-05-01

## 2021-06-15 NOTE — TELEPHONE ENCOUNTER
Medication: Escitalopram 20MG tablets   Last Date Filled: 12/3/2020  Last appointment addressing medication: 12/10/2019  Last B/P:  BP Readings from Last 3 Encounters:   12/10/19 110/70   09/09/19 106/74   08/08/19 112/86     Last labs pertaining to refill:11/16/2017      Pend medication and associate diagnosis before routing to Provider for review.       If patient has not been seen in over 1 year, pend 30 day supply and notify patient they are due for an appointment before any further refills.       Pended 30 day supply pt due for apt.     Left message to call back for: pt- does not have a mail box set up yet   Information to relay to patient:  Due for med check, Please help pt schedule

## 2021-06-15 NOTE — TELEPHONE ENCOUNTER
RN cannot approve Refill Request    RN can NOT refill this medication PCP messaged that patient is overdue for Office Visit. Last office visit: 12/10/2019 Priya Caal MD Last Physical: Visit date not found Last MTM visit: Visit date not found Last visit same specialty: 12/10/2019 Priya Caal MD.  Next visit within 3 mo: Visit date not found  Next physical within 3 mo: Visit date not found      Ling Tatum, [facility] Triage/Med Refill 3/12/2021    Requested Prescriptions   Pending Prescriptions Disp Refills     methocarbamoL (ROBAXIN) 750 MG tablet 90 tablet 1     Sig: TAKE 1 TABLET(750 MG) BY MOUTH THREE TIMES DAILY AS NEEDED       There is no refill protocol information for this order        hydrOXYzine HCL (ATARAX) 25 MG tablet 90 tablet 8       Antihistamine Refill Protocol Failed - 3/12/2021  5:31 PM        Failed - Patient has had office visit/physical in last year     Last office visit with prescriber/PCP: 12/10/2019 Priya Caal MD OR same dept: Visit date not found OR same specialty: 12/10/2019 Priya Caal MD  Last physical: Visit date not found Last MTM visit: Visit date not found   Next visit within 3 mo: Visit date not found  Next physical within 3 mo: Visit date not found  Prescriber OR PCP: Priya Caal MD  Last diagnosis associated with med order: 1. Neck pain  - methocarbamoL (ROBAXIN) 750 MG tablet; TAKE 1 TABLET(750 MG) BY MOUTH THREE TIMES DAILY AS NEEDED  Dispense: 90 tablet; Refill: 1    2. Generalized anxiety disorder  - hydrOXYzine HCL (ATARAX) 25 MG tablet  Dispense: 90 tablet; Refill: 8    If protocol passes may refill for 12 months if within 3 months of last provider visit (or a total of 15 months).

## 2021-06-16 PROBLEM — F10.21 ALCOHOL DEPENDENCE IN REMISSION (H): Status: ACTIVE | Noted: 2019-09-18

## 2021-06-16 PROBLEM — H81.8X9 PERSISTENT POSTURAL-PERCEPTUAL DIZZINESS: Status: ACTIVE | Noted: 2018-09-16

## 2021-06-16 PROBLEM — F32.9 MAJOR DEPRESSION: Status: ACTIVE | Noted: 2018-05-01

## 2021-06-16 NOTE — TELEPHONE ENCOUNTER
RN cannot approve Refill Request    RN can NOT refill this medication PCP messaged that patient is overdue for Office Visit. Last office visit: 12/10/2019 Priya Caal MD Last Physical: Visit date not found Last MTM visit: Visit date not found Last visit same specialty: 12/10/2019 Priya Caal MD.  Next visit within 3 mo: Visit date not found  Next physical within 3 mo: Visit date not found      Ling Tatum, [facility] Triage/Med Refill 4/2/2021    Requested Prescriptions   Pending Prescriptions Disp Refills     escitalopram oxalate (LEXAPRO) 20 MG tablet 30 tablet 0     Sig: Take 1 tablet (20 mg total) by mouth daily.       [facility]RI Refill Protocol  Failed - 4/2/2021  1:25 PM        Failed - PCP or prescribing provider visit in last year     Last office visit with prescriber/PCP: 12/10/2019 Priya Caal MD OR same dept: Visit date not found OR same specialty: 12/10/2019 Priya Caal MD  Last physical: Visit date not found Last MTM visit: Visit date not found   Next visit within 3 mo: Visit date not found  Next physical within 3 mo: Visit date not found  Prescriber OR PCP: Priya Caal MD  Last diagnosis associated with med order: 1. Generalized anxiety disorder  - escitalopram oxalate (LEXAPRO) 20 MG tablet; Take 1 tablet (20 mg total) by mouth daily.  Dispense: 30 tablet; Refill: 0    If protocol passes may refill for 12 months if within 3 months of last provider visit (or a total of 15 months).

## 2021-06-16 NOTE — PROGRESS NOTES
OV  3/1/2018  Assessment:      1. Dizziness     2. Generalized anxiety disorder  ALPRAZolam (XANAX) 1 MG tablet   3. Migraine  topiramate (TOPAMAX) 50 MG tablet   4. Cervicalgia          Plan:       We reviewed the etiology and natural history for depression/anxiety and options for treatment. We elected to increase his escitalopram to 20 mg daily from 15 mg. We reviewed the potential side effects, need to taper the medications gradually, and indications for urgent evaluation. As far as his dizziness and anxiety, he will continue the topamax and alprazolam prn. We discussed the potential for abuse or harm from misuse with the aprazolam, and we had him sign a treatment agreement today. I will complete his FMLA forms again today and he will follow up with any significant changes. He will let me know if he has any significant problems or concerns. He should f/u in 4-6 mos.     For HCM, he is agreeable to doing cologuard at this time and we will send his information.     Subjective:           Larry Denson is a 52 y.o. male who presents for follow up of anxiety disorder and chronic dizziness. He has the following anxiety symptoms: feelings of losing control and racing thoughts. Onset of symptoms was several years ago. Symptoms have been controlled with his current regimen. Current symptoms include psychomotor agitation, difficulty concentrating and loss of energy/fatigue and feelings of losing control and racing thoughts. Patient denies hypersomnia, insomnia and recurrent thoughts of death. He denies current suicidal and homicidal ideation.        Current treatment for depression: Medication: escitalopram and alprazolam tid. He complains of the following side effects from the treatment: none.         As far as his dizziness symptoms, he was getting eye therapy and completed 28 sessions with minimal improvement in the dizziness symptoms. He had the eye doctor complete the FMLA last time but is no longer getting those  "treatments, so needs them completed here. Ears take some time to stabilize in the am - about an hour. They feel plugged constantly. Some days he will bend over or move suddenly and then needs more time to adjust before he can go to work and some days things just don't settle down. He misses maybe 2 days per month, but timing is unpredictable. Vision went black one day after bending over. He reports a sensation of pulling to the left for years, but more right-sided symptoms recently. Migraines are better recently. He has been intolerant of gabapentin - makes dizziness worse.     The following portions of the patient's history were reviewed and updated as appropriate: allergies, current medications, past family history, past medical history, past social history, past surgical history and problem list.    Review of Systems  Pertinent items are noted in HPI.        Objective:     /82  Pulse 64  Ht 5' 7.5\" (1.715 m)  Wt 184 lb 6 oz (83.6 kg)  BMI 28.45 kg/m2  Physical Exam:  GEN: Alert and oriented, NAD,  well nourished  SKIN:  Normal skin turgor, no lesions/rashes   HEENT: moist mucous membranes, no rhinorrhea.    NECK: Normal.  No adenopathy or thyromegaly.  CV: Regular rate and rhythm, no murmurs.   LUNGS: Clear to auscultation bilaterally.    ABDOMEN: Soft, non-tender, non-distended, no masses   EXTREMITY: No edema, cyanosis  NEURO: Grossly normal.     Mental Status Examination:  Dress, grooming, personal hygiene: Appropriate  Speech: Appropriate  Mood: Appropriate   "

## 2021-06-17 NOTE — PATIENT INSTRUCTIONS - HE
Patient Instructions by Priya Caal MD at 3/22/2019  2:20 PM     Author: Priya Caal MD Service: -- Author Type: Physician    Filed: 3/22/2019  3:16 PM Encounter Date: 3/22/2019 Status: Addendum    : Priya Caal MD (Physician)    Related Notes: Original Note by Priya Caal MD (Physician) filed at 3/22/2019  3:15 PM        Schedule shoulder injection with Dr Paredes or Dr Llamas.    Shoulder Pain  [email or web address]/1/2000  A common cause of shoulder pain is soreness of the tendon (a cord that attaches a muscle to a bone) of the rotator cuff. This is the part of the shoulder that helps circular motion. Another common cause is soreness of the subacromial bursa (a sac of fluid under the highest part of the shoulder).  You might experience soreness after painting, lifting items, or playing a sport--anything that requires you to lift your arms. Or you may not remember any specific injury, but you still feel pain in your shoulder.  How does the rotator cuff get hurt?  The main joint in the shoulder is formed by the arm bone and the shoulder blade. The joint socket is shallow, allowing a wide range of motion in the arm. The rotator cuff is made up of 4 muscles that surround the arm bone. This cuff keeps the shoulder steady as the arm moves.  The supraspinatus muscle rests on top of the shoulder. Its tendon travels under the bone on the outside of the shoulder (the acromion). This tendon is the one most often injured because of its position between the bones. As the tendon becomes inflamed (sore and swollen), it can become pinched between the 2 bones. The sac of fluid that cushions the tendon can also be damaged.  How do I know the rotator cuff is hurt?  If the rotator cuff is involved, the pain is usually in the front or outside of the shoulder. This pain is usually worse when you raise your arm or lift something above your  head. The pain can be bad enough to keep you from doing even the simplest tasks. Pain at night is common, and it may be bad enough to wake you.  Path to improved health  Your doctor can help you with a treatment plan to relieve the pain and help you restore your shoulder to normal function. Pain relief strategies include active rest. During active rest, you can and should move your shoulder. Avoid difficult activities like lifting heavy objects or playing tennis. You may also get relief by applying ice, taking nonsteroidal anti-inflammatory medicine such as ibuprofen (brand names: Advil, Motrin) or naproxen (brand name: Aleve), and, occasionally, an injection of anti-inflammatory steroids. Special exercises may also help.  The first step of rehabilitation therapy is simple range-of-motion exercises. By bending over and moving (rotating) your shoulder in large circles, you will help to avoid the serious complication of rotator cuff injury, called a frozen shoulder. You should follow these range-of-motion exercises with resistance exercises using rubber tubing or light dumbbells. The final step is resistance training with weight machines or free weights.  What exercises should I do?  The following exercises may help you. Ask your doctor if you should do other exercises, too.  Range of motion  Stand up and lean over so youre facing the floor. Let your sore arm dangle straight down. Draw circles in the air with your sore arm. Start with small circles, and then draw bigger ones. Repeat these exercises 5 to 10 times during the day. If you have pain, stop. You can try again later.  Rotator cuff strengthening  Use a piece of rubber tubing for these exercises. Stand next to a closed door with a doorknob. Loop the tubing around the knob. With your hand that is closest to the door, bend your arm at a 90  angle (at the elbow) and grab the loop of the tubing. Pull the band across your tummy. At first, do 1 set of 10 exercises.  Try to increase the number of sets as your shoulder pain lessens. Do these exercises every day.  Upper extremity strengthening  As your pain goes away, try adding a general upper body weight-lifting program using weight machines or free weights. Lie on your right side with your left arm at your side. With a weight in your left hand and your forearm across your tummy, raise your forearm. Keep your elbow near your side.  What else can I do to help this injury heal?  An aerobic exercise program will help improve the blood flow to the tendon or bursa. The blood flow will help reduce soreness. Smokers should quit smoking so more oxygen reaches the injured tendon. This will help the injury heal faster.  Things to consider  It often takes a shoulder a long time to heal. The earlier you address the pain, the better. Depending on your injury, you should be able to make a full recovery. However, many people complain that even with a full recovery, their shoulder is not as strong as before.  When to see a doctor  Sometimes an injury that lasts a long time will cause the tendon to tear. Tell your doctor if your pain goes on in spite of a good treatment program. Or when there is weakness in certain motions of the arm. You may have torn your rotator cuff. This type of injury may need surgery.  Questions for your doctor    What is causing my shoulder pain?    Could my shoulder pain be caused by my sleep position?    Do I need to limit my activities?    Why is it taking so long for my shoulder to heal?    How can I prevent a repeat of my shoulder pain if I dont know what caused it?    Will my shoulder heal completely?  Resources  National Institutes of Health, MedlinePlus: Shoulder Injuries  National Institutes of Arthritis and Musculoskeletal and Skin Diseases    2017 American [employer]

## 2021-06-17 NOTE — TELEPHONE ENCOUNTER
Refill Approved    Rx renewed per Medication Renewal Policy. Medication was last renewed on 4/5/21.    Mika Galvez, [facility] [place] Connection Triage/Med Refill 5/14/2021     Requested Prescriptions   Pending Prescriptions Disp Refills     escitalopram oxalate (LEXAPRO) 20 MG tablet 30 tablet 0     Sig: Take 1 tablet (20 mg total) by mouth daily.       [facility] Refill Protocol  Passed - 5/13/2021  9:22 AM        Passed - PCP or prescribing provider visit in last year     Last office visit with prescriber/PCP: 4/26/2021 Priya Caal MD OR same dept: 4/26/2021 Priya Caal MD OR same specialty: 4/26/2021 Priya Caal MD  Last physical: Visit date not found Last MTM visit: Visit date not found   Next visit within 3 mo: Visit date not found  Next physical within 3 mo: Visit date not found  Prescriber OR PCP: Priya Caal MD  Last diagnosis associated with med order: 1. Generalized anxiety disorder  - escitalopram oxalate (LEXAPRO) 20 MG tablet; Take 1 tablet (20 mg total) by mouth daily.  Dispense: 30 tablet; Refill: 0    If protocol passes may refill for 12 months if within 3 months of last provider visit (or a total of 15 months).

## 2021-06-17 NOTE — PROGRESS NOTES
OV  5/1/2018  Assessment:         1. Dizziness     2. Generalized anxiety disorder  buPROPion (WELLBUTRIN XL) 150 MG 24 hr tablet    ALPRAZolam (XANAX) 1 MG tablet   3. Foot pain, bilateral  Ambulatory referral to Podiatry   4. Major depression  buPROPion (WELLBUTRIN XL) 150 MG 24 hr tablet   5. Controlled substance agreement signed            Plan:       We reviewed the etiology and natural history for depression/anxiety and options for treatment. We elected to continue escitalopram and add buproprion, and we discussed dose titration. We reviewed the potential side effects, need to taper the medications gradually, and indications for urgent evaluation. He will let me know if he has any significant problems or concerns. He will continue the topamax for his migraines. As far as his anxiety and dizziness, he will continue the alprazolam at his same dosing. We discussed the potential for abuse or harm from misuse for the alprazolam, and we had him sign a new treatment agreement today. For his foot and ankle pain, I sent a referral for podiatry.  He should f/u in 4-6 mos for his next medication check.     For HCM, he completed cologuard last month.     Subjective:            Larry Denson is a 52 y.o. male who presents for follow up of depression and anxiety. His wife accompanies him today as she is concerned about a progressive change in mood/personality over the last few years. He has been more sensitive/irritable and much more emotional than in the past. Miguel Angel wonders if his medications may be contributing to his symptoms. He definitely seems worse in the evenings.  He denies current suicidal and homicidal ideation.        Current treatment for depression: Medication: escitalopram. He complains of the following side effects from the treatment: none.       His dizziness persists, but is improved. He has to sleep on his right side only to minimize issues. Migraines are better and his is taking methocarbamol x 3 with  "good pain relief in neck.        Feet and ankles painful, R>L. Worse at end of day. Feet stiff after sitting. Worked as  for years, uneven surfaces.     The following portions of the patient's history were reviewed and updated as appropriate: allergies, current medications, past family history, past medical history, past social history, past surgical history and problem list.    Review of Systems  Pertinent items are noted in HPI.        Objective:     /80  Pulse 78  Ht 5' 7.5\" (1.715 m)  Wt 179 lb (81.2 kg)  SpO2 95%  BMI 27.62 kg/m2  Physical Exam:  GEN: Alert and oriented, NAD,  well nourished  SKIN:  Normal skin turgor, no lesions/rashes   HEENT: moist mucous membranes, no rhinorrhea.    NECK: Normal.  No adenopathy or thyromegaly.  CV: Regular rate and rhythm, no murmurs.   LUNGS: Clear to auscultation bilaterally.    ABDOMEN: Soft, non-tender, non-distended, no masses   EXTREMITY: No edema, cyanosis  NEURO: Grossly normal.     Mental Status Examination:  Dress, grooming, personal hygiene: Appropriate  Speech: Appropriate  Mood: Appropriate   "

## 2021-06-17 NOTE — PROGRESS NOTES
OV  4/26/2021  Assessment:      1. Persistent postural-perceptual dizziness     2. Dizziness     3. Generalized anxiety disorder     4. Migraine without status migrainosus, not intractable, unspecified migraine type     5. Screen for colon cancer  Cologuard   6. Screening for diabetes mellitus     7. Screening for cardiovascular condition  Lipid Mosier    Comprehensive Metabolic Panel    HM2(CBC w/o Differential)          Plan:          We reviewed the etiology and natural history for depression/anxiety and options for treatment. We elected to continue escitalopram as tolerated, and we discussed dose titration. We reviewed the potential side effects, need to taper the medications gradually, and indications for urgent evaluation. We discussed some additional options for his dizziness and he prefers to keep doing what he's doing for now. I placed orders for future fasting labs, and he will return to clinic for those in the near future. He will let me know if he has any significant problems or concerns. He should f/u in 4-6 mos for recheck.        Subjective:              Larry Denson is a 55 y.o. male who presents for follow up of anxiety, persistent postural-perceptual dizziness, migraine headaches and history of alcohol dependence. He had been on lexapro and has tapered off his clonazepam, robaxin and hydroxyzine a while ago. He denies any issues with alcohol use, and has no plans to resume drinking. He continues to have intermittent episodes of dizziness that can last 3-4 days. He feels like he is in a constant fog and is unhappy with his quality of life at this time. He feels like he is ultrasensitive to stimuli due to the 3PD. His last treatment was at [facility] 2-3 yrs ago but he doesn't feel like it did much. He denies overt panic attacks, but feels like he is always on edge, very jumpy and startles easily. He can't read or watch TV due to his ongoing dizziness. He feels worse with increased physical activity. 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 That has been going well, but he can only do 4-5 hours daily. His migraines haven't been much of an issue recently.         Current treatment for depression: Medication: escitalopram. He complains of the following side effects from the treatment: none.        He notes that his sister-in-law recently moved in with them. He reports marital issues for quite a while, and that hasn't helped.      The following portions of the patient's history were reviewed and updated as appropriate: allergies, current medications, past family history, past medical history, past social history, past surgical history and problem list.    Review of Systems  A 12 point comprehensive review of systems was negative except as noted.        Objective:     Vitals:    04/26/21 1159   BP: 112/88   Patient Position: Sitting   Cuff Size: Adult Regular   Pulse: 81   SpO2: 98%   Weight: 192 lb (87.1 kg)   Height: 5' 7.5\" (1.715 m)      Body mass index is 29.63 kg/m .   Physical Exam:  GEN: Alert and oriented, NAD,  well nourished  SKIN:  Normal skin turgor, no lesions/rashes   HEENT: moist mucous membranes, no rhinorrhea.    NECK: Normal.  No adenopathy or thyromegaly.  CV: Regular rate and rhythm, no murmurs.   LUNGS: Clear to auscultation bilaterally.    ABDOMEN: Soft, non-tender, non-distended, no masses   EXTREMITY: No edema, cyanosis  NEURO: Grossly normal.     Mental Status Examination:  Dress, grooming, personal hygiene: Appropriate  Speech: Appropriate  Mood: Appropriate    "

## 2021-06-18 NOTE — LETTER
Letter by Priya Caal MD at      Author: Priya Caal MD Service: -- Author Type: --    Filed:  Encounter Date: 1/22/2019 Status: (Other)       January 22, 2019     Patient: Larry Denson   YOB: 1965   Date of Visit: 1/22/2019       To Whom It May Concern:    It is my medical opinion that Larry Denson should be continued on alprazolam and slowly tapered if necessary.     If you have any questions or concerns, please don't hesitate to call.    Sincerely,        Priya Caal MD

## 2021-06-19 NOTE — PROGRESS NOTES
"Larry Denson is a 52 y.o. male [facility]  Onset: 2010  Episodic vs Chronic: Chronic although activity makes it worse such as movement and reading   Description of episodes: Denies room spinning, he endorses light headed and off balance.  Last episode:  Feels today  Frequency of episodes: N/A  Positional: Rolling over in bed makes it worse  Change in hearing with episodes:  no  Otologic history of infections or surgery: no   History of headaches: migraines  Headaches with episodes: He does not necessarily associate  History of head trauma: None known  Previous evaluations: Cognitive testing, [facility] - diagnosed with vestibular migraine he is on medication for this for the last two years - no change in dizziness.  He has a diagnosis of \"3PD\" Postural-perceptual dizziness from [facility]   Previous medications: Several, do not have outside records to review.  Loud noise and bright lights bother him and he gets migraines  He notes at the end of the interview that he is specifically here for fullness and pain in the ears.    ALLERGY:    Allergies   Allergen Reactions     Penicillins        MEDICATIONS:     Current Outpatient Prescriptions on File Prior to Visit   Medication Sig Dispense Refill     ALPRAZolam (XANAX) 1 MG tablet Take 1 tablet (1 mg total) by mouth 3 (three) times a day as needed for anxiety. 90 tablet 5     buPROPion (WELLBUTRIN XL) 150 MG 24 hr tablet Take 1 tablet (150 mg total) by mouth daily. 30 tablet 5     escitalopram oxalate (LEXAPRO) 20 MG tablet Take 1 tablet (20 mg total) by mouth daily. 90 tablet 0     topiramate (TOPAMAX) 50 MG tablet Take 1 tablet (50 mg total) by mouth at bedtime. 180 tablet 1     No current facility-administered medications on file prior to visit.        Past Medical/Surgical History, Family History and Social History reviewed in detail and documented separately in the medical record.    Complete Review of Systems:  A " 10-point review was performed.  Pertinent positives are noted in the HPI and on a separate scanned document in the chart.    EXAM:  There were no vitals filed for this visit.    Nurse documentation reviewed  and documented separately.    General Appearance: Pleasant, alert, appropriate appearance for age. No acute distress    Head Exam: Normal. Normocephalic, atraumatic.    Eye Exam: Normal external eye, conjunctiva, lids, cornea. Extra-ocular movements are intact.    Left external ear: normal  Left otoscopic exam: Normal EAC. Normal TM     Right external ear: normal  Right otoscopic exam: Normal EAC. Normal TM    Nose Exam: Normal external nose. Septum midline. Nasal mucosa normal.  Inferior turbinates normal.    OroPharynx Exam: Dental hygiene adequate. Normal tongue. Normal buccal mucosa. Normal palate.  Normal pharynx. Normal tonsils.    Neck Exam: Supple, no masses or nodes. Trachea and larynx midline.    Thyroid Exam: No tenderness, nodules or enlargement.    Salivary Glands: nontender without masses    Neuro: Alert and oriented times 3, CN 2-12 grossly intact, no nystagmus, PERRL, EOMI, normal speech and gait    Chest/Respiratory Exam: Normal chest wall motion and respiratory effort. No audible stridor or wheezing.    Cardiovascular Exam: Regular rate and rhythm.  No cyanosis, clubbing or edema.    Pulses: carotid pulses normal      ASSESSMENT:  1. Otalgia of both ears    2. Dizziness        PLAN: Findings, assessment, and management options were discussed. In regards to his dizziness he has had very thorough evaluations with central diagnoses such as 3PD and vestibular migraines.  I agree this very much does not sound like a peripheral vestibulopathy.  I was clear only at the end of the visit that he did not necessarily want an opinion about his dizziness but his aural fullness.  We discussed thathi testing for his ear showed no pressure issues and this could be referred TMJ pain given his mixed dentition.   "I offered TMJ clinic referral but he declined.  He has several visits set up with [facility] and offered to keep my dooor open for him for further evaluation.  I would need his outside records for review.  Although I do not think this is likely an \"inner ear\" issue.        "

## 2021-06-19 NOTE — PROGRESS NOTES
"Hearing evaluation in conjunction with ENT exam (Dr. Avilez)    History:  Patient reported having been seen previously at [facility]; this is his first ENT appointment for his dizziness. He reported having a diagnosis of 3PD (Persistent Postural-Perceptual Dizziness), with vestibular migraine and \"brain fog\". 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 He endorsed bilateral aural fullness and non-pulsatile tinnitus, as well as known bilateral hearing loss.     Results:  Otoscopy was unremarkable in either ear. Hearing sensitivity was assessed with good reliability using insert phones.      Right ear:   Normal/borderline normal hearing sensitivity for 250-3000 Hz and 7111-0487 Hz; mild sensorineural hearing loss noted at 4000 Hz only.    Left ear:  Normal/borderline normal hearing sensitivity for 250-3000 Hz, sloping to mild-moderate sensorineural hearing loss for 5983-3524 Hz.  Hearing asymmetry was noted between ears; Alice was negative at 4000 Hz.      Speech reception thresholds showed agreement with pure tone findings in each ear. Word recognition ability was excellent, bilaterally, with presentation levels at typical conversational volume in both ears.    Tympanometry was consistent with normal middle ear function, bilaterally.    Recommendations:  Follow-up with ENT; retest hearing per medical management.  Wear hearing protection consistently in noise to preserve residual hearing sensitivity.  Larry Veerachel is a potential binaural amplification candidate, if patient motivation exists and medical clearance is granted.    Alfred Benson, [facility]A  [place] Licensed Audiologist [id number]          "

## 2021-06-20 NOTE — PROGRESS NOTES
S: Patient was seen today at the [facility] in [place] with a prescription from Dr. Church for bilateral custom FOs. According to the patient, he is experiencing pain and discomfort at the dorsum/medial aspect of the LT midfoot and forefoot. Pain has been ongoing for 5+ years and is mainly sporadic but also based on the activity the patient is doing. Patient states that he has worn custom FOs in the past that he received from a chiropractor for his hips. Patient states he stopped wearing the FOs because he felt off balance. Patient works at [employer] and spends the majority of his work day on his feet.    O: Patient is 5'8'', weighs 175 lbs, and current shoe size is a men's 10. Patient presents with bilateral flexible forefoot, RT 2 degrees of valgus hindfoot alignment (reducible) with moderate medial longitudinal arch collapse, and LT 4 degrees of valgus hindfoot alignment (reducible) with full medial longitudinal arch collapse while weight bearing. Patient was alert/oriented during the appointment and not in distress. Patient ambulates with antalgic gait and without the use of ambulatory aids.    A: Impressions were taken with foam blocks as the patient was semi weight bearing. Valgus hindfoot alignment was reduced during the molding procedure. FOs will be fabricated by [employer] with top layer Spenco, mid layer LUKASZ and base layer cork (MTPJs length). Cushion heels will be incorporated into the new FOs. Custom FOs are required due to patient's foot structure not being accommodated by an OTS FO and degree of correction needed to reduce valgus hindfoot alignment.     G: Custom FOs will treat patient's current condition by providing support to the medial longitudinal arch and reducing valgus hindfoot alignment which will lead to joint stability, reduction in pain, and increase patient's ADLs.    P: Patient's insurance will be contacted to determine benefits and then will contact patient with  that information.    This note was electronically signed by Brady BENJAMIN , ABC #[id number], License #[id number]

## 2021-06-20 NOTE — PROGRESS NOTES
Admission History & Physical  Larry Denson, 1965, [id number]    [facility]  Priya Caal MD, [phone]    Extended Emergency Contact Information  Primary Emergency Contact: Yajaira Denson  Address: [address] [id number] [place]  Home Phone: [phone]  Work Phone: [phone]  Relation: Spouse     Assessment and Plan:   Assessment: Pronation deformity  Plan: I have recommended orthotics  Active Problems:    * No active hospital problems. *      Chief Complaint:  Bilateral foot pain     HPI:    Larry Denson is a 53 y.o. old male who presented to the clinic today complaining of moderate pain involving both feet.  He stated that the pain is intermittent.  He has been working in construction for several years.  He feels as though this has affected his feet over a long period of time.  He describes it as a aching type pain.  The pain is relieved with nonweightbearing.  He has not had any associated redness or swelling.  The pain is nonradiating.  He denies any other previous treatment.  History is provided by patient    Medical History  Active Ambulatory (Non-Hospital) Problems    Diagnosis     Persistent postural-perceptual dizziness     Controlled substance agreement signed     Major depression     Hearing Loss     Dizziness     Memory Lapses Or Loss     Migraine Headache     Fatigue     Right Rotator Cuff Tendonitis     Cubital Tunnel Syndrome     Cervicalgia     Nicotine Dependence     Generalized Anxiety Disorder     Past Medical History:   Diagnosis Date     Anxiety      Depression      Migraine      Smoker      Patient Active Problem List    Diagnosis Date Noted     Persistent postural-perceptual dizziness 09/16/2018     Controlled substance agreement signed 05/01/2018     Major depression 05/01/2018     Hearing Loss      Dizziness      Memory Lapses Or Loss      Migraine Headache      Fatigue      Right Rotator Cuff  Tendonitis      Cubital Tunnel Syndrome      Cervicalgia      Nicotine Dependence      Generalized Anxiety Disorder      Surgical History  He  has a past surgical history that includes Carpal tunnel release and Hernia repair.   Past Surgical History:   Procedure Laterality Date     CARPAL TUNNEL RELEASE       HERNIA REPAIR      Social History  Reviewed, and he  reports that he has been smoking Cigarettes.  He has a 30.00 pack-year smoking history. He has never used smokeless tobacco. He reports that he does not drink alcohol or use illicit drugs.  Social History   Substance Use Topics     Smoking status: Current Every Day Smoker     Packs/day: 1.00     Years: 30.00     Types: Cigarettes     Smokeless tobacco: Never Used     Alcohol use No      Allergies  Allergies   Allergen Reactions     Penicillins     Family History  Reviewed, and family history includes No Medical Problems in his brother, father, mother, and sister.   Psychosocial Needs  Social History     Social History Narrative     Additional psychosocial needs reviewed per nursing assessment.       Prior to Admission Medications     (Not in a hospital admission)        Review of Systems - Negative     /88  Pulse 72  SpO2 95%    Objective findings: General: The patient is alert and in no acute distress      Integument: Nails bilateral feet are normal length and color. Skin bilateral feet warm supple and intact.  No skin lesions are noted.        Vascular: DP and PT pulses palpable bilaterally.  Capillary refill less than 2 seconds bilaterally.      Neurologic: Negative clonus, negative Babinski bilateral feet.      Musculoskeletal: Range of motion within normal limits bilateral feet.  Muscle power +5/5 bilaterally in all compartments.  There is moderate flattening of the medial longitudinal arch noted upon weightbearing.      Assessment: Pronation deformity        Plan: I informed the patient that his feet look well.  My only recommendation will be a  pair of orthotics.  After wearing orthotics for several months if his pain persist I recommend he return to the clinic for follow-up visit.

## 2021-06-20 NOTE — PROGRESS NOTES
OV  9/7/2018  Assessment:         1. Generalized anxiety disorder  buPROPion (WELLBUTRIN XL) 300 MG 24 hr tablet   2. Major depression  buPROPion (WELLBUTRIN XL) 300 MG 24 hr tablet   3. Persistent postural-perceptual dizziness          Plan:       We reviewed the etiology and natural history for depression/anxiety and options for treatment. We elected to continue escitalopram, wellbutrin and alprazolam, and we discussed increasing the wellbutrin to 300 mg daily. We reviewed the potential side effects, need to taper the medications gradually, and indications for urgent evaluation. He will continue the alprazolam as he has been and we discussed the importance of tapering slowly. We discussed the potential for abuse or harm from misuse for the alprazolam, and he has a current treatment agreement in place. He will let me know if he has any significant problems or concerns. He should f/u in 4-6 mos for recheck.       Subjective:           Larry Denson is a 52 y.o. male who presents for follow up of depression and anxiety. He has the following anxiety symptoms: difficulty concentrating, feelings of losing control, racing thoughts and inability to relax. Onset of symptoms was several years ago. Symptoms have been stable since that time. Current symptoms also include depressed mood, fatigue and feelings of worthlessness/guilt. Patient denies anhedonia, hopelessness and recurrent thoughts of death. He denies current suicidal and homicidal ideation.        Current treatment for depression: Medication: escitalopram and buproprion and alprazolam. He complains of the following side effects from the treatment: none.       Wife sees improvement in mood, temper on buproprion, but he hasn't noted a big change.       He reports ongoing dizziness and has started specific therapy for PPPD at [facility] and is optiomistic. He reports that it consists of meditation, breathing exercises, and balance.        Some increase in dizziness when  "hasn't eaten in a while. Eats healthy - eggs and toast in am. Balanced diet, more fruits and veggies       The following portions of the patient's history were reviewed and updated as appropriate: allergies, current medications, past family history, past medical history, past social history, past surgical history and problem list.    Review of Systems  Pertinent items are noted in HPI.        Objective:     Vitals:    09/07/18 1610   BP: 122/78   Pulse: 72   SpO2: 98%   Weight: 178 lb (80.7 kg)   Height: 5' 7.5\" (1.715 m)      Body mass index is 27.47 kg/(m^2).   Physical Exam:  GEN: Alert and oriented, NAD,  well nourished  SKIN:  Normal skin turgor, no lesions/rashes   HEENT: moist mucous membranes, no rhinorrhea.    NECK: Normal.  No adenopathy or thyromegaly.  CV: Regular rate and rhythm, no murmurs.   LUNGS: Clear to auscultation bilaterally.    ABDOMEN: Soft, non-tender, non-distended, no masses   EXTREMITY: No edema, cyanosis  NEURO: Grossly normal.     Mental Status Examination:  Dress, grooming, personal hygiene: Appropriate  Speech: Appropriate  Mood: Appropriate   "

## 2021-06-23 NOTE — TELEPHONE ENCOUNTER
We could have him take hydroxyzine as needed. May cause drowsiness, but can be effective for anxiety in lower doses. Buspar would be another option, but he is not going to get the same immediate effect from either of those.

## 2021-06-23 NOTE — TELEPHONE ENCOUNTER
[facility]      Placed in Dr. Caal's inbox      01/22/19

## 2021-06-23 NOTE — TELEPHONE ENCOUNTER
Recommend try hydroxyzine. Rx sent. His lexapro should also be helping with the anxiety, but doesn't have the immediate effect that the alprazolam does.

## 2021-06-23 NOTE — TELEPHONE ENCOUNTER
LM to call back with information on paperwork.  What date did he stop working and what is the estimated time to go back to work

## 2021-06-23 NOTE — TELEPHONE ENCOUNTER
Pt agreeable to either one. He would like whichever one Dr Caal feels is appropriate. H.DeGree, CMA

## 2021-06-23 NOTE — TELEPHONE ENCOUNTER
"Name of form/paperwork: \" Medication form\"  [facility] -   for  XANAX   Have you been seen for this request: NO - Patient currently in-patient(  [facility] treatment facility)   Do we have the form: fax[facility] today to [facility] from [facility]. Please confirm if received.  When is form needed by:  TODAY   How would you like the form returned:  Fax   Fax Number: on form - call wife when received & completed   Patient Notified form requests are processed in 3-5 business days: Yes  (If patient needs form sooner, please note that in this message.)  Okay to leave a detailed message? Yes    SEE BELOW MESSAGES   "

## 2021-06-23 NOTE — TELEPHONE ENCOUNTER
FYI - Status Update  Who is Calling: Patient  Update: Calling to let Priya Caal MD know he has evaluation tomorrow for treatment for ETOH at [facility] and maybe admitted to in patient treatment as soon as tomorrow.     They will be weaning patient off of the bupropion and alprazolam.  Just wanted to let Priya Caal MD know.    Larry wants to thank Keira for yesterday!   Okay to leave a detailed message?:  No return call needed

## 2021-06-23 NOTE — TELEPHONE ENCOUNTER
"Medication Question or Clarification  Who is calling: Other: Patient's wife Yajaira  What medication are you calling about?: ALPRAZolam (XANAX)  What dose do you take?: 1 mg tablet  How often are you taking the medication?:   Who prescribed the medication?: Priya Caal MD  What is your question/concern?:  Patient is in [facility] detox center, prior to entering treatment at [facility] for alcohol. Patient was told he also needs to stop his Xanax and he is \"freaking out\". Patient's wife Yajaira is asking for Dr. Caal's input. If possible, if she can confer with detox staff, please advise. [facility] numbers are either [phone] or patient called from [phone]. Please reach out to Yoanna. Yoanna is on patient's consent to communicate.  Pharmacy: [facility] [place]  Okay to leave a detailed message?: Yes, Yajaria's cell [phone]  Site CMT - Please call the pharmacy to obtain any additional needed information.    Order Providers   Prescribing Provider Encounter Provider   Priya Caal MD [place], Lisa Dever, MD   Medication Detail    Disp Refills Start End    ALPRAZolam (XANAX) 1 MG tablet 90 tablet 5 10/31/2018     Sig: TAKE 1 TABLET(1 MG) BY MOUTH THREE TIMES DAILY AS NEEDED FOR ANXIETY    Sent to pharmacy as: ALPRAZolam (XANAX) 1 MG tablet    E-Prescribing Status: Receipt confirmed by pharmacy (10/31/2018  4:00 PM CDT)    Associated Diagnoses   Generalized anxiety disorder       Pharmacy   [facility] [address] [place] [address] - [address] AT [facility] [address]       "

## 2021-06-23 NOTE — TELEPHONE ENCOUNTER
Medication Question or Clarification  Who is calling: Patient  What medication are you calling about?: ALPRAZolam (XANAX) 1 MG tablet  What dose do you take?: 1 mg  How often are you taking the medication?: Daily  Who prescribed the medication?: Priya Caal MD  What is your question/concern?: Patient is getting admitted into treatment for ETOH soon, waiting on a bed.  Patient is wondering what Priya Caal MD would like to replace this medication with as he cannot be on this in treatment.  Patient has cut down to 1 tablet once daily currently as he is weaning off himself but once in treatment he cannot have this medication.  Patient reports he is anxious with taking 1 tablet daily but knows it is a process.  Please advise on an alternative and send to pharmacy in [place].  Pharmacy: [id number]  Okay to leave a detailed message?: Yes  Site CMT - Please call the pharmacy to obtain any additional needed information.

## 2021-06-24 NOTE — TELEPHONE ENCOUNTER
RN cannot approve Refill Request    RN can NOT refill this medication med is not covered by policy/route to provider and medication not on med list. Last office visit: 9/7/2018 Priya Caal MD Last Physical: Visit date not found Last MTM visit: Visit date not found Last visit same specialty: 9/7/2018 Priya Caal MD.  Next visit within 3 mo: Visit date not found  Next physical within 3 mo: Visit date not found      Amparo Myers, [facility] Connection Triage/Med Refill 3/6/2019    Requested Prescriptions   Pending Prescriptions Disp Refills     methocarbamol (ROBAXIN) 500 MG tablet [Pharmacy Med Name: METHOCARBAMOL 500MG TABLETS] 90 tablet 0     Sig: TAKE 1 TABLET(500 MG) BY MOUTH THREE TIMES DAILY    There is no refill protocol information for this order

## 2021-06-24 NOTE — TELEPHONE ENCOUNTER
Received fax from [facility] [facility] on patient      Placed in Dr. Caal's inbox      02/20/19

## 2021-06-24 NOTE — TELEPHONE ENCOUNTER
Wants paperwork to state that he can return to work tomorrow and work 4 days a week 8 hours as he was doing in the past.

## 2021-06-24 NOTE — TELEPHONE ENCOUNTER
Pt dropped of form to be filled out for return to work      Please call when complete and ready for .      02/25/19

## 2021-07-03 NOTE — ADDENDUM NOTE
Addendum Note by Chula Miller MD at 7/9/2018  8:23 AM     Author: Chula Miller MD Service: -- Author Type: Physician    Filed: 7/9/2018  8:23 AM Encounter Date: 7/7/2018 Status: Signed    : Chula Miller MD (Physician)    Addended by: CHULA MILLER on: 7/9/2018 08:23 AM        Modules accepted: Orders

## 2021-08-15 ENCOUNTER — HEALTH MAINTENANCE LETTER (OUTPATIENT)
Age: 56
End: 2021-08-15

## 2021-10-11 ENCOUNTER — HEALTH MAINTENANCE LETTER (OUTPATIENT)
Age: 56
End: 2021-10-11

## 2022-05-19 DIAGNOSIS — F41.1 GENERALIZED ANXIETY DISORDER: ICD-10-CM

## 2022-05-20 RX ORDER — ESCITALOPRAM OXALATE 20 MG/1
20 TABLET ORAL DAILY
Qty: 90 TABLET | Refills: 3 | Status: SHIPPED | OUTPATIENT
Start: 2022-05-20 | End: 2023-05-03

## 2022-05-20 NOTE — TELEPHONE ENCOUNTER
"Last Written Prescription Date:  5/14/21  Last Fill Quantity: 90,  # refills: 3   Last office visit provider:  4/26/21     Requested Prescriptions   Pending Prescriptions Disp Refills     escitalopram (LEXAPRO) 20 MG tablet 90 tablet 3     Sig: Take 1 tablet (20 mg) by mouth daily       SSRIs Protocol Passed - 5/20/2022  2:16 PM        Passed - Recent (12 mo) or future (30 days) visit within the authorizing provider's specialty     Patient has had an office visit with the authorizing provider or a provider within the authorizing providers department within the previous 12 mos or has a future within next 30 days. See \"Patient Info\" tab in inbasket, or \"Choose Columns\" in Meds & Orders section of the refill encounter.              Passed - Medication is active on med list        Passed - Patient is age 18 or older             Mika Glavez RN 05/20/22 2:18 PM  "

## 2022-05-25 ENCOUNTER — OFFICE VISIT (OUTPATIENT)
Dept: FAMILY MEDICINE | Facility: CLINIC | Age: 57
End: 2022-05-25
Payer: COMMERCIAL

## 2022-05-25 VITALS
BODY MASS INDEX: 29.1 KG/M2 | HEART RATE: 68 BPM | TEMPERATURE: 97.6 F | OXYGEN SATURATION: 99 % | SYSTOLIC BLOOD PRESSURE: 124 MMHG | DIASTOLIC BLOOD PRESSURE: 76 MMHG | RESPIRATION RATE: 18 BRPM | WEIGHT: 192 LBS | HEIGHT: 68 IN

## 2022-05-25 DIAGNOSIS — H81.8X9 PERSISTENT POSTURAL-PERCEPTUAL DIZZINESS: ICD-10-CM

## 2022-05-25 DIAGNOSIS — Z13.1 SCREENING FOR DIABETES MELLITUS: ICD-10-CM

## 2022-05-25 DIAGNOSIS — Z12.11 SCREEN FOR COLON CANCER: ICD-10-CM

## 2022-05-25 DIAGNOSIS — F41.1 GENERALIZED ANXIETY DISORDER: ICD-10-CM

## 2022-05-25 DIAGNOSIS — Z13.6 SCREENING FOR CARDIOVASCULAR CONDITION: ICD-10-CM

## 2022-05-25 DIAGNOSIS — Z11.4 SCREENING FOR HIV (HUMAN IMMUNODEFICIENCY VIRUS): ICD-10-CM

## 2022-05-25 DIAGNOSIS — F10.21 ALCOHOL DEPENDENCE IN REMISSION (H): ICD-10-CM

## 2022-05-25 DIAGNOSIS — Z11.59 NEED FOR HEPATITIS C SCREENING TEST: ICD-10-CM

## 2022-05-25 DIAGNOSIS — Z13.220 SCREENING FOR HYPERLIPIDEMIA: ICD-10-CM

## 2022-05-25 DIAGNOSIS — B35.3 TINEA PEDIS OF BOTH FEET: ICD-10-CM

## 2022-05-25 DIAGNOSIS — Z00.00 ANNUAL PHYSICAL EXAM: Primary | ICD-10-CM

## 2022-05-25 DIAGNOSIS — Z12.5 SCREENING FOR PROSTATE CANCER: ICD-10-CM

## 2022-05-25 DIAGNOSIS — F17.200 TOBACCO USE DISORDER: ICD-10-CM

## 2022-05-25 PROBLEM — Z79.899 CONTROLLED SUBSTANCE AGREEMENT SIGNED: Status: RESOLVED | Noted: 2018-05-01 | Resolved: 2022-05-25

## 2022-05-25 LAB
ANION GAP SERPL CALCULATED.3IONS-SCNC: 10 MMOL/L (ref 5–18)
BUN SERPL-MCNC: 13 MG/DL (ref 8–22)
CALCIUM SERPL-MCNC: 9.2 MG/DL (ref 8.5–10.5)
CHLORIDE BLD-SCNC: 100 MMOL/L (ref 98–107)
CHOLEST SERPL-MCNC: 273 MG/DL
CO2 SERPL-SCNC: 29 MMOL/L (ref 22–31)
CREAT SERPL-MCNC: 0.89 MG/DL (ref 0.7–1.3)
FASTING STATUS PATIENT QL REPORTED: ABNORMAL
GFR SERPL CREATININE-BSD FRML MDRD: >90 ML/MIN/1.73M2
GLUCOSE BLD-MCNC: 96 MG/DL (ref 70–125)
HDLC SERPL-MCNC: 47 MG/DL
HIV 1+2 AB+HIV1 P24 AG SERPL QL IA: NEGATIVE
LDLC SERPL CALC-MCNC: 197 MG/DL
POTASSIUM BLD-SCNC: 4.4 MMOL/L (ref 3.5–5)
PSA SERPL-MCNC: 0.88 UG/L (ref 0–3.5)
SODIUM SERPL-SCNC: 139 MMOL/L (ref 136–145)
TRIGL SERPL-MCNC: 143 MG/DL

## 2022-05-25 PROCEDURE — 87389 HIV-1 AG W/HIV-1&-2 AB AG IA: CPT | Performed by: NURSE PRACTITIONER

## 2022-05-25 PROCEDURE — 36415 COLL VENOUS BLD VENIPUNCTURE: CPT | Performed by: NURSE PRACTITIONER

## 2022-05-25 PROCEDURE — 99396 PREV VISIT EST AGE 40-64: CPT | Performed by: NURSE PRACTITIONER

## 2022-05-25 PROCEDURE — 80048 BASIC METABOLIC PNL TOTAL CA: CPT | Performed by: NURSE PRACTITIONER

## 2022-05-25 PROCEDURE — G0103 PSA SCREENING: HCPCS | Performed by: NURSE PRACTITIONER

## 2022-05-25 PROCEDURE — 86803 HEPATITIS C AB TEST: CPT | Performed by: NURSE PRACTITIONER

## 2022-05-25 PROCEDURE — 80061 LIPID PANEL: CPT | Performed by: NURSE PRACTITIONER

## 2022-05-25 RX ORDER — CLOTRIMAZOLE 1 %
CREAM (GRAM) TOPICAL 2 TIMES DAILY
Qty: 28 G | Refills: 0 | Status: SHIPPED | OUTPATIENT
Start: 2022-05-25 | End: 2024-07-15

## 2022-05-25 ASSESSMENT — ENCOUNTER SYMPTOMS
WEAKNESS: 0
COUGH: 0
CHILLS: 0
PALPITATIONS: 0
HEMATOCHEZIA: 0
FEVER: 0
DYSURIA: 0
HEMATURIA: 0
FREQUENCY: 0
PARESTHESIAS: 0
DIARRHEA: 0
NAUSEA: 0
DIZZINESS: 1
MYALGIAS: 0
CONSTIPATION: 0
ARTHRALGIAS: 1
SHORTNESS OF BREATH: 0
HEARTBURN: 0
HEADACHES: 0
JOINT SWELLING: 0
SORE THROAT: 0
NERVOUS/ANXIOUS: 0
ABDOMINAL PAIN: 0
EYE PAIN: 0

## 2022-05-25 ASSESSMENT — ANXIETY QUESTIONNAIRES
1. FEELING NERVOUS, ANXIOUS, OR ON EDGE: SEVERAL DAYS
GAD7 TOTAL SCORE: 3
3. WORRYING TOO MUCH ABOUT DIFFERENT THINGS: SEVERAL DAYS
GAD7 TOTAL SCORE: 3
2. NOT BEING ABLE TO STOP OR CONTROL WORRYING: NOT AT ALL
6. BECOMING EASILY ANNOYED OR IRRITABLE: NOT AT ALL
5. BEING SO RESTLESS THAT IT IS HARD TO SIT STILL: NOT AT ALL
4. TROUBLE RELAXING: SEVERAL DAYS
7. FEELING AFRAID AS IF SOMETHING AWFUL MIGHT HAPPEN: NOT AT ALL
GAD7 TOTAL SCORE: 3
8. IF YOU CHECKED OFF ANY PROBLEMS, HOW DIFFICULT HAVE THESE MADE IT FOR YOU TO DO YOUR WORK, TAKE CARE OF THINGS AT HOME, OR GET ALONG WITH OTHER PEOPLE?: SOMEWHAT DIFFICULT
7. FEELING AFRAID AS IF SOMETHING AWFUL MIGHT HAPPEN: NOT AT ALL

## 2022-05-25 ASSESSMENT — PATIENT HEALTH QUESTIONNAIRE - PHQ9
SUM OF ALL RESPONSES TO PHQ QUESTIONS 1-9: 8
SUM OF ALL RESPONSES TO PHQ QUESTIONS 1-9: 8
10. IF YOU CHECKED OFF ANY PROBLEMS, HOW DIFFICULT HAVE THESE PROBLEMS MADE IT FOR YOU TO DO YOUR WORK, TAKE CARE OF THINGS AT HOME, OR GET ALONG WITH OTHER PEOPLE: SOMEWHAT DIFFICULT

## 2022-05-25 ASSESSMENT — PAIN SCALES - GENERAL: PAINLEVEL: NO PAIN (0)

## 2022-05-25 NOTE — PROGRESS NOTES
Answers for HPI/ROS submitted by the patient on 5/25/2022  If you checked off any problems, how difficult have these problems made it for you to do your work, take care of things at home, or get along with other people?: Somewhat difficult  PHQ9 TOTAL SCORE: 8  ELISA 7 TOTAL SCORE: 3    SUBJECTIVE:   CC: Larry Denson is an 56 year old male who presents for preventative health visit.     Still avoiding alcohol. Switched from cigarettes to vaping now and is working on weaning down.  Uses cannabis occasionally.    Foot rash noticed 2 weeks ago. Using sprays no help. No itch. No pain.  Never happened before.  No other skin issues.      Patient has been advised of split billing requirements and indicates understanding: Yes  Healthy Habits:     Getting at least 3 servings of Calcium per day:  Yes    Bi-annual eye exam:  NO    Dental care twice a year:  NO    Sleep apnea or symptoms of sleep apnea:  None    Diet:  Regular (no restrictions)    Frequency of exercise:  2-3 days/week    Duration of exercise:  15-30 minutes    Taking medications regularly:  Yes    Medication side effects:  None    PHQ-2 Total Score: 1    Additional concerns today:  No    Today's PHQ-2 Score:   PHQ-2 ( 1999 Pfizer) 5/25/2022   Q1: Little interest or pleasure in doing things Not at all   Q2: Feeling down, depressed or hopeless Several days   PHQ-2 Score Incomplete       Abuse: Current or Past(Physical, Sexual or Emotional)- No  Do you feel safe in your environment? Yes    Have you ever done Advance Care Planning? (For example, a Health Directive, POLST, or a discussion with a medical provider or your loved ones about your wishes): No, advance care planning information given to patient to review.  Advanced care planning was discussed at today's visit.    Social History     Tobacco Use     Smoking status: Current Every Day Smoker     Packs/day: 1.00     Years: 30.00     Pack years: 30.00     Types: Cigarettes     Smokeless tobacco: Never Used  "  Substance Use Topics     Alcohol use: No     If you drink alcohol do you typically have >3 drinks per day or >7 drinks per week? No    No flowsheet data found.    Last PSA: No results found for: PSA    Reviewed orders with patient. Reviewed health maintenance and updated orders accordingly - Yes  Lab work is in process    Reviewed and updated as needed this visit by clinical staff   Tobacco  Allergies  Meds                Reviewed and updated as needed this visit by Provider                   Past Medical History:   Diagnosis Date     Anxiety      Depression      Migraine      Smoker       Past Surgical History:   Procedure Laterality Date     HERNIA REPAIR       RELEASE CARPAL TUNNEL         Review of Systems  CONSTITUTIONAL: NEGATIVE for fever, chills, change in weight  INTEGUMENTARY/SKIN: NEGATIVE for worrisome rashes, moles or lesions  EYES: NEGATIVE for vision changes or irritation  ENT: NEGATIVE for ear, mouth and throat problems  RESP: NEGATIVE for significant cough or SOB  CV: NEGATIVE for chest pain, palpitations or peripheral edema  GI: NEGATIVE for nausea, abdominal pain, heartburn, or change in bowel habits   male: negative for dysuria, hematuria, decreased urinary stream, erectile dysfunction, urethral discharge  MUSCULOSKELETAL: NEGATIVE for significant arthralgias or myalgia  NEURO: NEGATIVE for weakness, dizziness or paresthesias  PSYCHIATRIC: NEGATIVE for changes in mood or affect    OBJECTIVE:   /76   Pulse 68   Temp 97.6  F (36.4  C) (Oral)   Resp 18   Ht 1.727 m (5' 8\")   Wt 87.1 kg (192 lb)   SpO2 99%   BMI 29.19 kg/m      Physical Exam  GENERAL: healthy, alert and no distress  EYES: Eyes grossly normal to inspection, PERRL and conjunctivae and sclerae normal  HENT: ear canals and TM's normal, nose and mouth without ulcers or lesions  NECK: no adenopathy, no asymmetry, masses, or scars and thyroid normal to palpation  RESP: lungs clear to auscultation - no rales, rhonchi or " wheezes  CV: regular rate and rhythm, normal S1 S2, no S3 or S4, no murmur, click or rub, no peripheral edema and peripheral pulses strong  ABDOMEN: soft, nontender, no hepatosplenomegaly, no masses and bowel sounds normal  MS: no gross musculoskeletal defects noted, no edema  SKIN: In between the toes on both feet there is white peeling skin with mild redness.  No blistering.  NEURO: Normal strength and tone, mentation intact and speech normal  PSYCH: mentation appears normal, affect normal/bright    Diagnostic Test Results:  Labs reviewed in Epic    ASSESSMENT/PLAN:       ICD-10-CM    1. Annual physical exam  Z00.00    2. Screening for HIV (human immunodeficiency virus)  Z11.4 HIV Antigen Antibody Combo     HIV Antigen Antibody Combo   3. Need for hepatitis C screening test  Z11.59 Hepatitis C Screen Reflex to HCV RNA Quant and Genotype     Hepatitis C Screen Reflex to HCV RNA Quant and Genotype   4. Screening for hyperlipidemia  Z13.220 Lipid panel reflex to direct LDL Fasting     Lipid panel reflex to direct LDL Fasting   5. Screen for colon cancer  Z12.11 COLOGUARD(EXACT SCIENCES)   6. Alcohol dependence in remission (H)  F10.21    7. Persistent postural-perceptual dizziness  R42    8. Generalized anxiety disorder  F41.1    9. Nicotine Dependence  F17.200    10. Screening for cardiovascular condition  Z13.6    11. Screening for diabetes mellitus  Z13.1 Basic metabolic panel     Basic metabolic panel   12. Screening for prostate cancer  Z12.5 PSA, screen     PSA, screen   13. Tinea pedis of both feet  B35.3 clotrimazole (LOTRIMIN) 1 % external cream     Keep working on getting off of nicotine.  Update screening labs.  Patient is only interested in ColPinguprd right now.  Clotrimazole sent for the feet.  Reviewed shingles vaccine.  Reviewed COVID shots.  Declines lung cancer screening.      Patient has been advised of split billing requirements and indicates understanding: Yes    COUNSELING:   Reviewed preventive  "health counseling, as reflected in patient instructions    Estimated body mass index is 29.19 kg/m  as calculated from the following:    Height as of this encounter: 1.727 m (5' 8\").    Weight as of this encounter: 87.1 kg (192 lb).     Weight management plan: Discussed healthy diet and exercise guidelines    He reports that he has been smoking cigarettes. He has a 30.00 pack-year smoking history. He has never used smokeless tobacco.  Tobacco Cessation Action Plan:   Information offered: Patient not interested at this time    Counseling Resources:  ATP IV Guidelines  Pooled Cohorts Equation Calculator  FRAX Risk Assessment  ICSI Preventive Guidelines  Dietary Guidelines for Americans, 2010  USDA's MyPlate  ASA Prophylaxis  Lung CA Screening    Joe Yang NP  [facility]  "

## 2022-05-25 NOTE — PATIENT INSTRUCTIONS
"That shingles vaccine we talked about is called \"Shingrix\". Check with your insurance to see if they cover it. If they do and you're interested in getting it, let me know and we can have you come in for just the shot without having to see me.    If you change your mind and your ever interested in that lung cancer screening, let me know.     Cologuard should be mailed to your house in the next month or so.     Covid shots available whenever interested.      Clotrimazole cream for the feet sent off to your pharmacy.      Preventive Health Recommendations  Male Ages 50 - 64    Yearly exam:             See your health care provider every year in order to  o   Review health changes.   o   Discuss preventive care.    o   Review your medicines if your doctor has prescribed any.     Have a cholesterol test every 5 years, or more frequently if you are at risk for high cholesterol/heart disease.     Have a diabetes test (fasting glucose) every three years. If you are at risk for diabetes, you should have this test more often.     Have a colonoscopy at age 50, or have a yearly FIT test (stool test). These exams will check for colon cancer.      Talk with your health care provider about whether or not a prostate cancer screening test (PSA) is right for you.    You should be tested each year for STDs (sexually transmitted diseases), if you re at risk.     Shots: Get a flu shot each year. Get a tetanus shot every 10 years.     Nutrition:    Eat at least 5 servings of fruits and vegetables daily.     Eat whole-grain bread, whole-wheat pasta and brown rice instead of white grains and rice.     Get adequate Calcium and Vitamin D.     Lifestyle    Exercise for at least 150 minutes a week (30 minutes a day, 5 days a week). This will help you control your weight and prevent disease.     Limit alcohol to one drink per day.     No smoking.     Wear sunscreen to prevent skin cancer.     See your dentist every six months for an exam and " cleaning.     See your eye doctor every 1 to 2 years.

## 2022-05-26 LAB — HCV AB SERPL QL IA: NONREACTIVE

## 2022-05-27 ENCOUNTER — TELEPHONE (OUTPATIENT)
Dept: FAMILY MEDICINE | Facility: CLINIC | Age: 57
End: 2022-05-27
Payer: COMMERCIAL

## 2022-05-27 DIAGNOSIS — E78.2 MIXED HYPERLIPIDEMIA: Primary | ICD-10-CM

## 2022-05-27 RX ORDER — ATORVASTATIN CALCIUM 20 MG/1
20 TABLET, FILM COATED ORAL DAILY
Qty: 90 TABLET | Refills: 0 | Status: SHIPPED | OUTPATIENT
Start: 2022-05-27 | End: 2023-05-03

## 2022-05-27 NOTE — LETTER
June 8, 2022      Larry Denson  [address]  [facility] [address]      Dear Larry Denson,     We have been unable to reach you by phone. Please review the results enclosed and recommendations from Joe Yang CNP.     Once in a lifetime low risk screening for HIV and hepatitis C is negative.  Prostate cancer screen looks fine.  Kidneys and electrolytes look fine along with blood sugar.    Cholesterol levels are very elevated.  He currently has a 14% + risk of developing a heart attack and stroke in the next 10 years.  When this risk gets higher than 7.5% we recommend starting medicine to lower your cholesterol.  Because of this I have sent atorvastatin to his pharmacy to take every evening.  After being on this medication for 2 months, schedule a lab only appointment to recheck lipids and liver function.  This medicine is generally well-tolerated but rarely can cause some allover muscle aches and if that happens, let me know.         Let me know if you have any questions.      Sincerely,       Joe Yang CNP //Team  [facility]

## 2022-05-27 NOTE — TELEPHONE ENCOUNTER
----- Message from Joe Yang NP sent at 5/27/2022  9:25 AM CDT -----  Please call patient.  Once in a lifetime low risk screening for HIV and hepatitis C is negative.  Prostate cancer screen looks fine.  Kidneys and electrolytes look fine along with blood sugar.      Cholesterol levels are very elevated.  He currently has a 14% + risk of developing a heart attack and stroke in the next 10 years.  When this risk gets higher than 7.5% we recommend starting medicine to lower your cholesterol.  Because of this I have sent atorvastatin to his pharmacy to take every evening.  After being on this medication for 2 months, schedule a lab only appointment to recheck lipids and liver function.  This medicine is generally well-tolerated but rarely can cause some allover muscle aches and if that happens, let me know.    Joe Yang, CNP

## 2022-06-18 LAB — NONINV COLON CA DNA+OCC BLD SCRN STL QL: NEGATIVE

## 2022-09-25 ENCOUNTER — HEALTH MAINTENANCE LETTER (OUTPATIENT)
Age: 57
End: 2022-09-25

## 2023-04-25 ENCOUNTER — PATIENT OUTREACH (OUTPATIENT)
Dept: CARE COORDINATION | Facility: CLINIC | Age: 58
End: 2023-04-25
Payer: COMMERCIAL

## 2023-05-03 ENCOUNTER — OFFICE VISIT (OUTPATIENT)
Dept: FAMILY MEDICINE | Facility: CLINIC | Age: 58
End: 2023-05-03
Payer: COMMERCIAL

## 2023-05-03 VITALS
RESPIRATION RATE: 16 BRPM | SYSTOLIC BLOOD PRESSURE: 120 MMHG | TEMPERATURE: 97.8 F | WEIGHT: 188.1 LBS | OXYGEN SATURATION: 97 % | BODY MASS INDEX: 27.86 KG/M2 | DIASTOLIC BLOOD PRESSURE: 76 MMHG | HEIGHT: 69 IN | HEART RATE: 76 BPM

## 2023-05-03 DIAGNOSIS — F41.1 GENERALIZED ANXIETY DISORDER: ICD-10-CM

## 2023-05-03 DIAGNOSIS — E78.2 MIXED HYPERLIPIDEMIA: Primary | ICD-10-CM

## 2023-05-03 LAB
CHOLEST SERPL-MCNC: 209 MG/DL
HDLC SERPL-MCNC: 39 MG/DL
LDLC SERPL CALC-MCNC: 134 MG/DL
NONHDLC SERPL-MCNC: 170 MG/DL
TRIGL SERPL-MCNC: 181 MG/DL

## 2023-05-03 PROCEDURE — 36415 COLL VENOUS BLD VENIPUNCTURE: CPT | Performed by: FAMILY MEDICINE

## 2023-05-03 PROCEDURE — 90471 IMMUNIZATION ADMIN: CPT | Performed by: FAMILY MEDICINE

## 2023-05-03 PROCEDURE — 80061 LIPID PANEL: CPT | Performed by: FAMILY MEDICINE

## 2023-05-03 PROCEDURE — 90750 HZV VACC RECOMBINANT IM: CPT | Performed by: FAMILY MEDICINE

## 2023-05-03 PROCEDURE — 99214 OFFICE O/P EST MOD 30 MIN: CPT | Mod: 25 | Performed by: FAMILY MEDICINE

## 2023-05-03 RX ORDER — ESCITALOPRAM OXALATE 20 MG/1
20 TABLET ORAL DAILY
Qty: 90 TABLET | Refills: 3 | Status: SHIPPED | OUTPATIENT
Start: 2023-05-03 | End: 2024-05-20

## 2023-05-03 ASSESSMENT — ANXIETY QUESTIONNAIRES
GAD7 TOTAL SCORE: 4
GAD7 TOTAL SCORE: 4
2. NOT BEING ABLE TO STOP OR CONTROL WORRYING: SEVERAL DAYS
3. WORRYING TOO MUCH ABOUT DIFFERENT THINGS: MORE THAN HALF THE DAYS
5. BEING SO RESTLESS THAT IT IS HARD TO SIT STILL: NOT AT ALL
7. FEELING AFRAID AS IF SOMETHING AWFUL MIGHT HAPPEN: NOT AT ALL
1. FEELING NERVOUS, ANXIOUS, OR ON EDGE: SEVERAL DAYS
7. FEELING AFRAID AS IF SOMETHING AWFUL MIGHT HAPPEN: NOT AT ALL
4. TROUBLE RELAXING: NOT AT ALL
8. IF YOU CHECKED OFF ANY PROBLEMS, HOW DIFFICULT HAVE THESE MADE IT FOR YOU TO DO YOUR WORK, TAKE CARE OF THINGS AT HOME, OR GET ALONG WITH OTHER PEOPLE?: SOMEWHAT DIFFICULT
IF YOU CHECKED OFF ANY PROBLEMS ON THIS QUESTIONNAIRE, HOW DIFFICULT HAVE THESE PROBLEMS MADE IT FOR YOU TO DO YOUR WORK, TAKE CARE OF THINGS AT HOME, OR GET ALONG WITH OTHER PEOPLE: SOMEWHAT DIFFICULT
6. BECOMING EASILY ANNOYED OR IRRITABLE: NOT AT ALL
GAD7 TOTAL SCORE: 4

## 2023-05-03 ASSESSMENT — PATIENT HEALTH QUESTIONNAIRE - PHQ9
SUM OF ALL RESPONSES TO PHQ QUESTIONS 1-9: 11
SUM OF ALL RESPONSES TO PHQ QUESTIONS 1-9: 11
10. IF YOU CHECKED OFF ANY PROBLEMS, HOW DIFFICULT HAVE THESE PROBLEMS MADE IT FOR YOU TO DO YOUR WORK, TAKE CARE OF THINGS AT HOME, OR GET ALONG WITH OTHER PEOPLE: SOMEWHAT DIFFICULT

## 2023-05-03 ASSESSMENT — PAIN SCALES - GENERAL: PAINLEVEL: MILD PAIN (2)

## 2023-05-03 NOTE — PROGRESS NOTES
"  Assessment & Plan   Problem List Items Addressed This Visit        Endocrine    Mixed hyperlipidemia - Primary    Relevant Orders    Lipid panel reflex to direct LDL Fasting (Completed)       Behavioral    Generalized Anxiety Disorder    Relevant Medications    escitalopram (LEXAPRO) 20 MG tablet           SANTY RODRIGEZ MD  [facility] FUENTES Juarez is a 57 year old, presenting for the following health issues:  Orders (Would like an order for a Cologuard test. ), Establish Care (Formerly Dr. Caal. ), Medication Update (Med check. Would like to do blood work- high cholesterol. Does not want to take medication for cholesterol. ), and Dizziness (Dizziness x 14 years. Has been seen and diagnosed at the [facility]. )        5/3/2023     2:13 PM   Additional Questions   Roomed by Sariah Lam     History of Present Illness       Reason for visit:  Chekup    He eats 0-1 servings of fruits and vegetables daily.He consumes 1 sweetened beverage(s) daily.He exercises with enough effort to increase his heart rate 9 or less minutes per day.  He exercises with enough effort to increase his heart rate 3 or less days per week.   He is taking medications regularly.    Today's PHQ-9         PHQ-9 Total Score: 11    PHQ-9 Q9 Thoughts of better off dead/self-harm past 2 weeks :   Not at all    How difficult have these problems made it for you to do your work, take care of things at home, or get along with other people: Somewhat difficult  Today's ELISA-7 Score: 4       Had Cologuard test 1 year ago.  Not due for 2 more years.  Discussed with patient      Objective    /76 (BP Location: Left arm, Patient Position: Sitting, Cuff Size: Adult Regular)   Pulse 76   Temp 97.8  F (36.6  C) (Oral)   Resp 16   Ht 1.74 m (5' 8.5\")   Wt 85.3 kg (188 lb 1.6 oz)   SpO2 97%   BMI 28.18 kg/m    Body mass index is 28.18 kg/m .  Physical Exam   GENERAL: healthy, alert and no distress  NECK: no " adenopathy, no asymmetry, masses, or scars and thyroid normal to palpation  RESP: lungs clear to auscultation - no rales, rhonchi or wheezes  CV: regular rate and rhythm, normal S1 S2, no S3 or S4, no murmur, click or rub, no peripheral edema and peripheral pulses strong  ABDOMEN: soft, nontender, no hepatosplenomegaly, no masses and bowel sounds normal  MS: no gross musculoskeletal defects noted, no edema

## 2023-05-09 ENCOUNTER — PATIENT OUTREACH (OUTPATIENT)
Dept: CARE COORDINATION | Facility: CLINIC | Age: 58
End: 2023-05-09
Payer: COMMERCIAL

## 2023-07-10 ENCOUNTER — ALLIED HEALTH/NURSE VISIT (OUTPATIENT)
Dept: FAMILY MEDICINE | Facility: CLINIC | Age: 58
End: 2023-07-10
Payer: COMMERCIAL

## 2023-07-10 DIAGNOSIS — Z23 NEED FOR SHINGLES VACCINE: Primary | ICD-10-CM

## 2023-07-10 PROCEDURE — 99207 PR NO CHARGE NURSE ONLY: CPT

## 2023-07-10 PROCEDURE — 90471 IMMUNIZATION ADMIN: CPT

## 2023-07-10 PROCEDURE — 90750 HZV VACC RECOMBINANT IM: CPT

## 2023-07-10 NOTE — PROGRESS NOTES
Prior to immunization administration, verified patients identity using patient s name and date of birth. Please see Immunization Activity for additional information.     Screening Questionnaire for Adult Immunization    Are you sick today?   No   Do you have allergies to medications, food, a vaccine component or latex?   No   Have you ever had a serious reaction after receiving a vaccination?   No   Do you have a long-term health problem with heart, lung, kidney, or metabolic disease (e.g., diabetes), asthma, a blood disorder, no spleen, complement component deficiency, a cochlear implant, or a spinal fluid leak?  Are you on long-term aspirin therapy?   No   Do you have cancer, leukemia, HIV/AIDS, or any other immune system problem?   No   Do you have a parent, brother, or sister with an immune system problem?   No   In the past 3 months, have you taken medications that affect  your immune system, such as prednisone, other steroids, or anticancer drugs; drugs for the treatment of rheumatoid arthritis, Crohn s disease, or psoriasis; or have you had radiation treatments?   No   Have you had a seizure, or a brain or other nervous system problem?   No   During the past year, have you received a transfusion of blood or blood    products, or been given immune (gamma) globulin or antiviral drug?   No   For women: Are you pregnant or is there a chance you could become       pregnant during the next month?   No   Have you received any vaccinations in the past 4 weeks?   No     Immunization questionnaire answers were all negative.    I have reviewed the following standing orders:   This patient is due and qualifies for the Zoster vaccine.    Click here for Zoster Standing Order    I have reviewed the vaccines inclusion and exclusion criteria; No concerns regarding eligibility.         Patient instructed to remain in clinic for 15 minutes afterwards, and to report any adverse reactions.     Screening performed by Lonny SMITH  Watson on 7/10/2023 at 9:19 AM.

## 2023-08-05 ENCOUNTER — HEALTH MAINTENANCE LETTER (OUTPATIENT)
Age: 58
End: 2023-08-05

## 2024-05-20 DIAGNOSIS — F41.1 GENERALIZED ANXIETY DISORDER: ICD-10-CM

## 2024-05-20 RX ORDER — ESCITALOPRAM OXALATE 20 MG/1
20 TABLET ORAL DAILY
Qty: 60 TABLET | Refills: 0 | Status: SHIPPED | OUTPATIENT
Start: 2024-05-20 | End: 2024-07-02

## 2024-07-02 ENCOUNTER — MYC REFILL (OUTPATIENT)
Dept: FAMILY MEDICINE | Facility: CLINIC | Age: 59
End: 2024-07-02
Payer: COMMERCIAL

## 2024-07-02 DIAGNOSIS — F41.1 GENERALIZED ANXIETY DISORDER: ICD-10-CM

## 2024-07-02 RX ORDER — ESCITALOPRAM OXALATE 20 MG/1
20 TABLET ORAL DAILY
Qty: 30 TABLET | Refills: 0 | Status: SHIPPED | OUTPATIENT
Start: 2024-07-02 | End: 2024-07-15

## 2024-07-03 NOTE — TELEPHONE ENCOUNTER
Future Appointments 7/3/2024 - 12/30/2024        Date Visit Type Length Department Provider     7/15/2024 10:00 AM OFFICE VISIT 30 min [facility]/OB Ang Rodrigues MD    Location Instructions:     [facility] is in the [facility] at [address] This is near the intersection of [facility] and [address] [place] [address], just south of [facility]. Access the parking lot from [address] [place] [address] by turning onto [address] or from [facility] by turning onto [address]. Through the main entrance, the clinic is directly to the right.

## 2024-07-12 ENCOUNTER — TELEPHONE (OUTPATIENT)
Dept: FAMILY MEDICINE | Facility: CLINIC | Age: 59
End: 2024-07-12
Payer: COMMERCIAL

## 2024-07-12 NOTE — TELEPHONE ENCOUNTER
"Pt CRM request:    \"I just found out that I will not have insurance as of Friday evening. I have a phone appointment for a prescription check, on Monday at 10:00 a.m. with my doctor. Can I pay cash and how much is it\"    [employer] message sent back to pt.   "

## 2024-07-14 ASSESSMENT — PATIENT HEALTH QUESTIONNAIRE - PHQ9
SUM OF ALL RESPONSES TO PHQ QUESTIONS 1-9: 13
SUM OF ALL RESPONSES TO PHQ QUESTIONS 1-9: 13
10. IF YOU CHECKED OFF ANY PROBLEMS, HOW DIFFICULT HAVE THESE PROBLEMS MADE IT FOR YOU TO DO YOUR WORK, TAKE CARE OF THINGS AT HOME, OR GET ALONG WITH OTHER PEOPLE: SOMEWHAT DIFFICULT

## 2024-07-15 ENCOUNTER — VIRTUAL VISIT (OUTPATIENT)
Dept: FAMILY MEDICINE | Facility: CLINIC | Age: 59
End: 2024-07-15

## 2024-07-15 DIAGNOSIS — F41.1 GENERALIZED ANXIETY DISORDER: Primary | ICD-10-CM

## 2024-07-15 DIAGNOSIS — F10.21 ALCOHOL DEPENDENCE IN REMISSION (H): ICD-10-CM

## 2024-07-15 DIAGNOSIS — M54.2 CERVICALGIA: ICD-10-CM

## 2024-07-15 PROCEDURE — 99442 PR PHYSICIAN TELEPHONE EVALUATION 11-20 MIN: CPT | Mod: 93 | Performed by: FAMILY MEDICINE

## 2024-07-15 RX ORDER — ESCITALOPRAM OXALATE 20 MG/1
20 TABLET ORAL DAILY
Qty: 30 TABLET | Refills: 2 | Status: SHIPPED | OUTPATIENT
Start: 2024-07-15

## 2024-07-15 RX ORDER — CYCLOBENZAPRINE HCL 5 MG
5 TABLET ORAL 3 TIMES DAILY PRN
Qty: 30 TABLET | Refills: 0 | Status: SHIPPED | OUTPATIENT
Start: 2024-07-15 | End: 2024-07-31

## 2024-07-15 NOTE — PROGRESS NOTES
Larry is a 58 year old who is being evaluated via a billable telephone visit.    What phone number would you like to be contacted at? [phone]   How would you like to obtain your AVS? Tawanda  Originating Location (pt. Location): Other Cabin     Distant Location (provider location):  On-site    Assessment & Plan   Problem List Items Addressed This Visit       Cervicalgia    Relevant Medications    cyclobenzaprine (FLEXERIL) 5 MG tablet    Generalized Anxiety Disorder - Primary    Relevant Medications    escitalopram (LEXAPRO) 20 MG tablet    Alcohol dependence in remission (H)      Continue on Lexapro 20 mg once a day.  Discussed other methods to help keep mental health in good shape including regular activity and good sleep and diet.  Patient requested    Muscle relaxant for occasional neck and low back pain.       Subjective   Larry is a 58 year old, presenting for the following health issues:  Medication Update (Med check. Pt states insurance was up last week. Pt states he attempted to pay out of pocket but it got all goofy. Pt states he is up [place] currently. Pt states insurance starts on the first of August. 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 Pt states he does not feel his lexapro is working. )        7/15/2024     9:55 AM   Additional Questions   Roomed by Sariah SMITH CMA     History of Present Illness       Reason for visit:  Meds refill    He eats 0-1 servings of fruits and vegetables daily.He consumes 0 sweetened beverage(s) daily.He exercises with enough effort to increase his heart rate 10 to 19 minutes per day.  He exercises with enough effort to increase his heart rate 6 days per week.   He is taking medications regularly.               Review of Systems  Constitutional, HEENT, cardiovascular, pulmonary, gi and gu systems are negative, except as otherwise noted.      Objective    Vitals - Patient  "Reported  Weight (Patient Reported): 75.8 kg (167 lb)  Height (Patient Reported): 174 cm (5' 8.5\")  BMI (Based on Pt Reported Ht/Wt): 25.02  Pain Score: Mild Pain (3)  Pain Loc: Neck        Physical Exam   General: Alert and no distress //Respiratory: No audible wheeze, cough, or shortness of breath // Psychiatric:  Appropriate affect, tone, and pace of words            Phone call duration: 18 minutes  Signed Electronically by: SANTY RODRIGEZ MD    "

## 2024-07-31 ENCOUNTER — MYC REFILL (OUTPATIENT)
Dept: FAMILY MEDICINE | Facility: CLINIC | Age: 59
End: 2024-07-31

## 2024-07-31 DIAGNOSIS — F41.1 GENERALIZED ANXIETY DISORDER: ICD-10-CM

## 2024-07-31 DIAGNOSIS — M54.2 CERVICALGIA: ICD-10-CM

## 2024-08-01 RX ORDER — ESCITALOPRAM OXALATE 20 MG/1
20 TABLET ORAL DAILY
Qty: 30 TABLET | Refills: 2 | OUTPATIENT
Start: 2024-08-01

## 2024-08-01 RX ORDER — CYCLOBENZAPRINE HCL 5 MG
5 TABLET ORAL 3 TIMES DAILY PRN
Qty: 30 TABLET | Refills: 0 | Status: SHIPPED | OUTPATIENT
Start: 2024-08-01

## 2024-09-28 ENCOUNTER — HEALTH MAINTENANCE LETTER (OUTPATIENT)
Age: 59
End: 2024-09-28

## 2024-10-09 ENCOUNTER — OFFICE VISIT (OUTPATIENT)
Dept: FAMILY MEDICINE | Facility: CLINIC | Age: 59
End: 2024-10-09

## 2024-10-09 VITALS
OXYGEN SATURATION: 99 % | RESPIRATION RATE: 16 BRPM | DIASTOLIC BLOOD PRESSURE: 81 MMHG | HEART RATE: 73 BPM | WEIGHT: 158 LBS | HEIGHT: 69 IN | SYSTOLIC BLOOD PRESSURE: 126 MMHG | BODY MASS INDEX: 23.4 KG/M2 | TEMPERATURE: 98.3 F

## 2024-10-09 DIAGNOSIS — F41.1 GENERALIZED ANXIETY DISORDER: Primary | ICD-10-CM

## 2024-10-09 PROCEDURE — 99213 OFFICE O/P EST LOW 20 MIN: CPT | Performed by: FAMILY MEDICINE

## 2024-10-09 RX ORDER — ESCITALOPRAM OXALATE 20 MG/1
20 TABLET ORAL DAILY
Qty: 90 TABLET | Refills: 3 | Status: SHIPPED | OUTPATIENT
Start: 2024-10-09

## 2024-10-09 ASSESSMENT — ANXIETY QUESTIONNAIRES
GAD7 TOTAL SCORE: 12
7. FEELING AFRAID AS IF SOMETHING AWFUL MIGHT HAPPEN: NEARLY EVERY DAY
3. WORRYING TOO MUCH ABOUT DIFFERENT THINGS: SEVERAL DAYS
5. BEING SO RESTLESS THAT IT IS HARD TO SIT STILL: SEVERAL DAYS
GAD7 TOTAL SCORE: 12
4. TROUBLE RELAXING: SEVERAL DAYS
GAD7 TOTAL SCORE: 12
7. FEELING AFRAID AS IF SOMETHING AWFUL MIGHT HAPPEN: NEARLY EVERY DAY
1. FEELING NERVOUS, ANXIOUS, OR ON EDGE: SEVERAL DAYS
IF YOU CHECKED OFF ANY PROBLEMS ON THIS QUESTIONNAIRE, HOW DIFFICULT HAVE THESE PROBLEMS MADE IT FOR YOU TO DO YOUR WORK, TAKE CARE OF THINGS AT HOME, OR GET ALONG WITH OTHER PEOPLE: SOMEWHAT DIFFICULT
8. IF YOU CHECKED OFF ANY PROBLEMS, HOW DIFFICULT HAVE THESE MADE IT FOR YOU TO DO YOUR WORK, TAKE CARE OF THINGS AT HOME, OR GET ALONG WITH OTHER PEOPLE?: SOMEWHAT DIFFICULT
2. NOT BEING ABLE TO STOP OR CONTROL WORRYING: MORE THAN HALF THE DAYS
6. BECOMING EASILY ANNOYED OR IRRITABLE: NEARLY EVERY DAY

## 2024-10-09 ASSESSMENT — PATIENT HEALTH QUESTIONNAIRE - PHQ9
SUM OF ALL RESPONSES TO PHQ QUESTIONS 1-9: 7
10. IF YOU CHECKED OFF ANY PROBLEMS, HOW DIFFICULT HAVE THESE PROBLEMS MADE IT FOR YOU TO DO YOUR WORK, TAKE CARE OF THINGS AT HOME, OR GET ALONG WITH OTHER PEOPLE: SOMEWHAT DIFFICULT
SUM OF ALL RESPONSES TO PHQ QUESTIONS 1-9: 7

## 2024-10-09 ASSESSMENT — PAIN SCALES - GENERAL: PAINLEVEL: NO PAIN (0)

## 2024-10-09 NOTE — PROGRESS NOTES
"  Assessment & Plan     Generalized anxiety disorder  Will continue on same regimen for now.  Patient having any increasing issues or problems, will contact us    - escitalopram (LEXAPRO) 20 MG tablet; Take 1 tablet (20 mg) by mouth daily.                Ascencion Juarez is a 59 year old, presenting for the following health issues:  Recheck Medication      10/9/2024    10:53 AM   Additional Questions   Roomed by jair quintero cma     History of Present Illness       Mental Health Follow-up:  Patient presents to follow-up on Depression & Anxiety.Patient's depression since last visit has been:  Good  The patient is not having other symptoms associated with depression.  Patient's anxiety since last visit has been:  Better  The patient is not having other symptoms associated with anxiety.  Any significant life events: financial concerns and housing concerns  Patient is not feeling anxious or having panic attacks.  Patient has no concerns about alcohol or drug use.    He eats 0-1 servings of fruits and vegetables daily.He consumes 1 sweetened beverage(s) daily.He exercises with enough effort to increase his heart rate 9 or less minutes per day.  He exercises with enough effort to increase his heart rate 6 days per week.   He is taking medications regularly.                Review of Systems  Constitutional, HEENT, cardiovascular, pulmonary, gi and gu systems are negative, except as otherwise noted.      Objective    /81 (BP Location: Right arm, Patient Position: Sitting, Cuff Size: Adult Regular)   Pulse 73   Temp 98.3  F (36.8  C)   Resp 16   Ht 1.74 m (5' 8.5\")   Wt 71.7 kg (158 lb)   SpO2 99%   BMI 23.67 kg/m    Body mass index is 23.67 kg/m .  Physical Exam   GENERAL: alert and no distress            Signed Electronically by: SANTY RODRIGEZ MD    "

## 2025-04-02 ENCOUNTER — TELEPHONE (OUTPATIENT)
Dept: FAMILY MEDICINE | Facility: CLINIC | Age: 60
End: 2025-04-02

## 2025-04-02 NOTE — TELEPHONE ENCOUNTER
Patient Quality Outreach    Patient is due for the following:   Physical Preventive Adult Physical    Action(s) Taken:   Schedule a Adult Preventative    Type of outreach:    Sent [employer] message.    Questions for provider review:    None         Jackie Webster  Chart routed to None.

## 2025-06-07 SDOH — HEALTH STABILITY: PHYSICAL HEALTH: ON AVERAGE, HOW MANY MINUTES DO YOU ENGAGE IN EXERCISE AT THIS LEVEL?: 60 MIN

## 2025-06-07 SDOH — HEALTH STABILITY: PHYSICAL HEALTH: ON AVERAGE, HOW MANY DAYS PER WEEK DO YOU ENGAGE IN MODERATE TO STRENUOUS EXERCISE (LIKE A BRISK WALK)?: 3 DAYS

## 2025-06-07 ASSESSMENT — SOCIAL DETERMINANTS OF HEALTH (SDOH): HOW OFTEN DO YOU GET TOGETHER WITH FRIENDS OR RELATIVES?: NEVER

## 2025-06-11 ENCOUNTER — ORDERS ONLY (AUTO-RELEASED) (OUTPATIENT)
Dept: FAMILY MEDICINE | Facility: CLINIC | Age: 60
End: 2025-06-11

## 2025-06-11 ENCOUNTER — OFFICE VISIT (OUTPATIENT)
Dept: FAMILY MEDICINE | Facility: CLINIC | Age: 60
End: 2025-06-11
Payer: COMMERCIAL

## 2025-06-11 VITALS
TEMPERATURE: 98.4 F | RESPIRATION RATE: 16 BRPM | OXYGEN SATURATION: 94 % | WEIGHT: 173 LBS | SYSTOLIC BLOOD PRESSURE: 110 MMHG | DIASTOLIC BLOOD PRESSURE: 70 MMHG | BODY MASS INDEX: 25.62 KG/M2 | HEART RATE: 68 BPM | HEIGHT: 69 IN

## 2025-06-11 DIAGNOSIS — H81.8X9 PERSISTENT POSTURAL-PERCEPTUAL DIZZINESS: ICD-10-CM

## 2025-06-11 DIAGNOSIS — Z12.11 SCREEN FOR COLON CANCER: ICD-10-CM

## 2025-06-11 DIAGNOSIS — Z00.00 ROUTINE GENERAL MEDICAL EXAMINATION AT A HEALTH CARE FACILITY: Primary | ICD-10-CM

## 2025-06-11 DIAGNOSIS — Z12.5 SCREENING FOR PROSTATE CANCER: ICD-10-CM

## 2025-06-11 DIAGNOSIS — F10.21 ALCOHOL DEPENDENCE IN REMISSION (H): ICD-10-CM

## 2025-06-11 DIAGNOSIS — N50.812 PAIN IN LEFT TESTICLE: ICD-10-CM

## 2025-06-11 DIAGNOSIS — Z13.1 SCREENING FOR DIABETES MELLITUS: ICD-10-CM

## 2025-06-11 DIAGNOSIS — F41.1 GENERALIZED ANXIETY DISORDER: ICD-10-CM

## 2025-06-11 DIAGNOSIS — E78.2 MIXED HYPERLIPIDEMIA: ICD-10-CM

## 2025-06-11 LAB
ALBUMIN UR-MCNC: NEGATIVE MG/DL
APPEARANCE UR: CLEAR
BACTERIA #/AREA URNS HPF: ABNORMAL /HPF
BILIRUB UR QL STRIP: NEGATIVE
COLOR UR AUTO: YELLOW
GLUCOSE UR STRIP-MCNC: NEGATIVE MG/DL
HGB UR QL STRIP: NEGATIVE
KETONES UR STRIP-MCNC: NEGATIVE MG/DL
LEUKOCYTE ESTERASE UR QL STRIP: NEGATIVE
NITRATE UR QL: NEGATIVE
PH UR STRIP: 7 [PH] (ref 5–8)
RBC #/AREA URNS AUTO: ABNORMAL /HPF
SP GR UR STRIP: 1.02 (ref 1–1.03)
SQUAMOUS #/AREA URNS AUTO: ABNORMAL /LPF
UROBILINOGEN UR STRIP-ACNC: 0.2 E.U./DL
WBC #/AREA URNS AUTO: ABNORMAL /HPF

## 2025-06-11 PROCEDURE — 80053 COMPREHEN METABOLIC PANEL: CPT | Performed by: NURSE PRACTITIONER

## 2025-06-11 PROCEDURE — 1126F AMNT PAIN NOTED NONE PRSNT: CPT | Performed by: NURSE PRACTITIONER

## 2025-06-11 PROCEDURE — 99214 OFFICE O/P EST MOD 30 MIN: CPT | Mod: 25 | Performed by: NURSE PRACTITIONER

## 2025-06-11 PROCEDURE — 80061 LIPID PANEL: CPT | Performed by: NURSE PRACTITIONER

## 2025-06-11 PROCEDURE — 3074F SYST BP LT 130 MM HG: CPT | Performed by: NURSE PRACTITIONER

## 2025-06-11 PROCEDURE — 81001 URINALYSIS AUTO W/SCOPE: CPT | Performed by: NURSE PRACTITIONER

## 2025-06-11 PROCEDURE — 3078F DIAST BP <80 MM HG: CPT | Performed by: NURSE PRACTITIONER

## 2025-06-11 PROCEDURE — G0103 PSA SCREENING: HCPCS | Performed by: NURSE PRACTITIONER

## 2025-06-11 PROCEDURE — 36415 COLL VENOUS BLD VENIPUNCTURE: CPT | Performed by: NURSE PRACTITIONER

## 2025-06-11 PROCEDURE — G2211 COMPLEX E/M VISIT ADD ON: HCPCS | Performed by: NURSE PRACTITIONER

## 2025-06-11 PROCEDURE — 87086 URINE CULTURE/COLONY COUNT: CPT | Performed by: NURSE PRACTITIONER

## 2025-06-11 PROCEDURE — 99396 PREV VISIT EST AGE 40-64: CPT | Performed by: NURSE PRACTITIONER

## 2025-06-11 RX ORDER — ESCITALOPRAM OXALATE 20 MG/1
20 TABLET ORAL DAILY
Qty: 90 TABLET | Refills: 3 | Status: SHIPPED | OUTPATIENT
Start: 2025-06-11

## 2025-06-11 ASSESSMENT — ANXIETY QUESTIONNAIRES
GAD7 TOTAL SCORE: 9
4. TROUBLE RELAXING: SEVERAL DAYS
5. BEING SO RESTLESS THAT IT IS HARD TO SIT STILL: SEVERAL DAYS
6. BECOMING EASILY ANNOYED OR IRRITABLE: SEVERAL DAYS
GAD7 TOTAL SCORE: 9
1. FEELING NERVOUS, ANXIOUS, OR ON EDGE: MORE THAN HALF THE DAYS
IF YOU CHECKED OFF ANY PROBLEMS ON THIS QUESTIONNAIRE, HOW DIFFICULT HAVE THESE PROBLEMS MADE IT FOR YOU TO DO YOUR WORK, TAKE CARE OF THINGS AT HOME, OR GET ALONG WITH OTHER PEOPLE: SOMEWHAT DIFFICULT
7. FEELING AFRAID AS IF SOMETHING AWFUL MIGHT HAPPEN: SEVERAL DAYS
7. FEELING AFRAID AS IF SOMETHING AWFUL MIGHT HAPPEN: SEVERAL DAYS
8. IF YOU CHECKED OFF ANY PROBLEMS, HOW DIFFICULT HAVE THESE MADE IT FOR YOU TO DO YOUR WORK, TAKE CARE OF THINGS AT HOME, OR GET ALONG WITH OTHER PEOPLE?: SOMEWHAT DIFFICULT
3. WORRYING TOO MUCH ABOUT DIFFERENT THINGS: MORE THAN HALF THE DAYS
GAD7 TOTAL SCORE: 9
2. NOT BEING ABLE TO STOP OR CONTROL WORRYING: SEVERAL DAYS

## 2025-06-11 ASSESSMENT — PATIENT HEALTH QUESTIONNAIRE - PHQ9
SUM OF ALL RESPONSES TO PHQ QUESTIONS 1-9: 12
SUM OF ALL RESPONSES TO PHQ QUESTIONS 1-9: 12
10. IF YOU CHECKED OFF ANY PROBLEMS, HOW DIFFICULT HAVE THESE PROBLEMS MADE IT FOR YOU TO DO YOUR WORK, TAKE CARE OF THINGS AT HOME, OR GET ALONG WITH OTHER PEOPLE: SOMEWHAT DIFFICULT

## 2025-06-11 ASSESSMENT — PAIN SCALES - GENERAL: PAINLEVEL_OUTOF10: NO PAIN (0)

## 2025-06-11 NOTE — PROGRESS NOTES
"Preventive Care Visit  [facility]  Joe Yang NP, Family Medicine  Jun 11, 2025      Assessment & Plan       ICD-10-CM    1. Routine general medical examination at a health care facility  Z00.00       2. Generalized anxiety disorder  F41.1 escitalopram (LEXAPRO) 20 MG tablet      3. Mixed hyperlipidemia  E78.2 Lipid panel reflex to direct LDL Non-fasting     Comprehensive metabolic panel (BMP + Alb, Alk Phos, ALT, AST, Total. Bili, TP)     Lipid panel reflex to direct LDL Non-fasting     Comprehensive metabolic panel (BMP + Alb, Alk Phos, ALT, AST, Total. Bili, TP)      4. Screening for diabetes mellitus  Z13.1       5. Screen for colon cancer  Z12.11 COLOGUARD([employer])      6. Persistent postural-perceptual dizziness  H81.8X9 Adult Neurology  Referral      7. Pain in left testicle  N50.812 UA with Microscopic     Urine Culture Aerobic Bacterial - lab collect     US Testicular & Scrotum w Doppler Ltd     UA with Microscopic     Urine Culture Aerobic Bacterial - lab collect     Urine Microscopic Exam      8. Screening for prostate cancer  Z12.5 PSA, screen     PSA, screen      9. Alcohol dependence in remission (H)  F10.21           [facility]  Update screening labs.  UA/UC for further evaluation of testicular pain although it does sound like it is getting better on its own.  No sign or symptom of torsion today.  If pain flares up again, ultrasound ordered.  Update colon cancer screening.    The longitudinal plan of care for the diagnosis(es)/condition(s) as documented were addressed during this visit. Due to the added complexity in care, I will continue to support Larry in the subsequent management and with ongoing continuity of care.      BMI  Estimated body mass index is 25.55 kg/m  as calculated from the following:    Height as of this encounter: 1.753 m (5' 9\").    Weight as of this encounter: 78.5 kg (173 lb). "       Counseling  Appropriate preventive services were addressed with this patient via screening, questionnaire, or discussion as appropriate for fall prevention, nutrition, physical activity, Tobacco-use cessation, social engagement, weight loss and cognition.  Checklist reviewing preventive services available has been given to the patient.  Reviewed patient's diet, addressing concerns and/or questions.   He is at risk for lack of exercise and has been provided with information to increase physical activity for the benefit of his well-being.   Patient is at risk for social isolation and has been provided with information about the benefit of social connection.   The patient was instructed to see the dentist every 6 months.   He is at risk for psychosocial distress and has been provided with information to reduce risk.   The patient's PHQ-9 score is consistent with moderate depression. He was provided with information regarding depression.       Ascencion uJarez is a 59 year old, presenting for the following:  Physical (Fasting - requesting screening psa ), Referral (Going through process of disability for dizziness requesting referral to be evaluated [facility]), and Testicular Problem (L testicle painful for 3 days, feels better has concerns about past vesicotomy. )        6/11/2025    10:03 AM   Additional Questions   Roomed by ARCELIA Cantor Student          HPI    Left testicular pain. Better today. Bothersome 3 days ago. Atraumtic.  No dysuria.  No hematuria. No std risk.    Continues to deal with chronic dizziness.  [facility].  Has unfortunately been unsuccessful in collecting SSDI    Did have some mood issues over the winter months.  Improving with spring/summer transition.    Continues sobriety    Advance Care Planning  Previously reviewed        6/7/2025   General Health   How would you rate your overall physical health? Good   Feel stress  (tense, anxious, or unable to sleep) To some extent   (!) STRESS CONCERN      6/7/2025   Nutrition   Three or more servings of calcium each day? Yes   Diet: Regular (no restrictions)   How many servings of fruit and vegetables per day? (!) 0-1   How many sweetened beverages each day? 0-1         6/7/2025   Exercise   Days per week of moderate/strenous exercise 3 days   Average minutes spent exercising at this level 60 min         6/7/2025   Social Factors   Frequency of gathering with friends or relatives Never   Worry food won't last until get money to buy more No   Food not last or not have enough money for food? No   Do you have housing? (Housing is defined as stable permanent housing and does not include staying outside in a car, in a tent, in an abandoned building, in an overnight shelter, or couch-surfing.) Yes   Are you worried about losing your housing? No   Lack of transportation? No   Unable to get utilities (heat,electricity)? No   (!) SOCIAL CONNECTIONS CONCERN      6/11/2025   Fall Risk   Gait Speed Test (Document in seconds) 4   Gait Speed Test Interpretation Less than or equal to 5.00 seconds - PASS          6/7/2025   Dental   Dentist two times every year? (!) NO       Today's PHQ-9 Score:       6/11/2025    10:02 AM   PHQ-9 SCORE   PHQ-9 Total Score MyChart 12 (Moderate depression)   PHQ-9 Total Score 12        Patient-reported         6/7/2025   Substance Use   Alcohol more than 3/day or more than 7/wk No   Do you use any other substances recreationally? No     Social History     Tobacco Use    Smoking status: Former     Current packs/day: 0.00     Average packs/day: 1 pack/day for 30.0 years (30.0 ttl pk-yrs)     Types: Cigarettes, Vaping Device     Start date: 1/1/1992     Quit date: 1/1/2022     Years since quitting: 3.4    Smokeless tobacco: Former   Vaping Use    Vaping status: Former    Substances: Nicotine    Devices: Refillable tank   Substance Use Topics    Alcohol use: No    Drug use: Yes  "    Types: Marijuana           6/7/2025   STI Screening   New sexual partner(s) since last STI/HIV test? No   Last PSA:   Prostate Specific Antigen Screen   Date Value Ref Range Status   05/25/2022 0.88 0.00 - 3.50 ug/L Final     ASCVD Risk   The 10-year ASCVD risk score (Dayton SALAZAR, et al., 2019) is: 6.6%    Values used to calculate the score:      Age: 59 years      Sex: Male      Is Non- : No      Diabetic: No      Tobacco smoker: No      Systolic Blood Pressure: 110 mmHg      Is BP treated: No      HDL Cholesterol: 39 mg/dL      Total Cholesterol: 209 mg/dL     Reviewed and updated as needed this visit by Provider                    Past Medical History:   Diagnosis Date    Anxiety     Depression     Migraine     Smoker      Past Surgical History:   Procedure Laterality Date    HERNIA REPAIR      x2    RELEASE CARPAL TUNNEL      ULNAR NERVE REPAIR Left          Review of Systems  Constitutional, HEENT, cardiovascular, pulmonary, gi and gu systems are negative, except as otherwise noted.     Objective    Exam  /70 (BP Location: Right arm, Patient Position: Sitting, Cuff Size: Adult Regular)   Pulse 68   Temp 98.4  F (36.9  C) (Oral)   Resp 16   Ht 1.753 m (5' 9\")   Wt 78.5 kg (173 lb)   SpO2 94%   BMI 25.55 kg/m     Estimated body mass index is 25.55 kg/m  as calculated from the following:    Height as of this encounter: 1.753 m (5' 9\").    Weight as of this encounter: 78.5 kg (173 lb).    Physical Exam  GENERAL: alert and no distress  EYES: Eyes grossly normal to inspection, PERRL and conjunctivae and sclerae normal  HENT: ear canals and TM's normal, nose and mouth without ulcers or lesions  NECK: no adenopathy, no asymmetry, masses, or scars  RESP: lungs clear to auscultation - no rales, rhonchi or wheezes  CV: regular rate and rhythm, normal S1 S2, no S3 or S4, no murmur, click or rub, no peripheral edema  ABDOMEN: soft, nontender, no hepatosplenomegaly, no masses " and bowel sounds normal   (male): normal male genitalia without lesions or urethral discharge, no hernia.  Circumcised.  No Nazario Clapper deformity.  Left testicle descended slightly more than the right.  No palpable lump.  No pain with palpation.  Cremasteric reflex intact with both testicles  MS: no gross musculoskeletal defects noted, no edema  SKIN: no suspicious lesions or rashes  NEURO: Normal strength and tone, mentation intact and speech normal  PSYCH: mentation appears normal, affect normal/bright  Gait and balance assessed per Gait Speed Test.  Result as above.        Signed Electronically by: Joe Yang NP    Answers submitted by the patient for this visit:  Patient Health Questionnaire (Submitted on 6/11/2025)  If you checked off any problems, how difficult have these problems made it for you to do your work, take care of things at home, or get along with other people?: Somewhat difficult  PHQ9 TOTAL SCORE: 12  Patient Health Questionnaire (G7) (Submitted on 6/11/2025)  ELISA 7 TOTAL SCORE: 9

## 2025-06-11 NOTE — PATIENT INSTRUCTIONS
Testicles feel okay today.  I did go ahead and order the urine test we talked about to make sure there is no infection.  If you start to get pain again though, I did highlight the scheduling number to get an ultrasound done of the testicles.    [facility].  We should get that sent off later today.    Refill of escitalopram sent to the pharmacy.    Updating screening labs today.    Cologuard should arrive at your house in the next 6-8 weeks.      Patient Education   Preventive Care Advice   This is general advice given by our system to help you stay healthy. However, your care team may have specific advice just for you. Please talk to your care team about your preventive care needs.  Nutrition  Eat 5 or more servings of fruits and vegetables each day.  Try wheat bread, brown rice and whole grain pasta (instead of white bread, rice, and pasta).  Get enough calcium and vitamin D. Check the label on foods and aim for 100% of the RDA (recommended daily allowance).  Lifestyle  Exercise at least 150 minutes each week  (30 minutes a day, 5 days a week).  Do muscle strengthening activities 2 days a week. These help control your weight and prevent disease.  No smoking.  Wear sunscreen to prevent skin cancer.  Have a dental exam and cleaning every 6 months.  Yearly exams  See your health care team every year to talk about:  Any changes in your health.  Any medicines your care team has prescribed.  Preventive care, family planning, and ways to prevent chronic diseases.  Shots (vaccines)   HPV shots (up to age 26), if you've never had them before.  Hepatitis B shots (up to age 59), if you've never had them before.  COVID-19 shot: Get this shot when it's due.  Flu shot: Get a flu shot every year.  Tetanus shot: Get a tetanus shot every 10 years.  Pneumococcal, hepatitis A, and RSV shots: Ask your care team if you need these based on your risk.  Shingles shot (for age 50 and  up)  General health tests  Diabetes screening:  Starting at age 35, Get screened for diabetes at least every 3 years.  If you are younger than age 35, ask your care team if you should be screened for diabetes.  Cholesterol test: At age 39, start having a cholesterol test every 5 years, or more often if advised.  Bone density scan (DEXA): At age 50, ask your care team if you should have this scan for osteoporosis (brittle bones).  Hepatitis C: Get tested at least once in your life.  STIs (sexually transmitted infections)  Before age 24: Ask your care team if you should be screened for STIs.  After age 24: Get screened for STIs if you're at risk. You are at risk for STIs (including HIV) if:  You are sexually active with more than one person.  You don't use condoms every time.  You or a partner was diagnosed with a sexually transmitted infection.  If you are at risk for HIV, ask about PrEP medicine to prevent HIV.  Get tested for HIV at least once in your life, whether you are at risk for HIV or not.  Cancer screening tests  Cervical cancer screening: If you have a cervix, begin getting regular cervical cancer screening tests starting at age 21.  Breast cancer scan (mammogram): If you've ever had breasts, begin having regular mammograms starting at age 40. This is a scan to check for breast cancer.  Colon cancer screening: It is important to start screening for colon cancer at age 45.  Have a colonoscopy test every 10 years (or more often if you're at risk) Or, ask your provider about stool tests like a FIT test every year or Cologuard test every 3 years.  To learn more about your testing options, visit:   .  For help making a decision, visit:   https://bit.ly/vl11261.  Prostate cancer screening test: If you have a prostate, ask your care team if a prostate cancer screening test (PSA) at age 55 is right for you.  Lung cancer screening: If you are a current or former smoker ages 50 to 80, ask your care team if ongoing  lung cancer screenings are right for you.  For informational purposes only. Not to replace the advice of your health care provider. Copyright   2023 [facility]. All rights reserved. Clinically reviewed by the [facility] Transitions Program. [employer] [id number] - REV 01/24.  Preventing Falls: Care Instructions  Injuries and health problems such as trouble walking or poor eyesight can increase your risk of falling. So can some medicines. But there are things you can do to help prevent falls. You can exercise to get stronger. You can also arrange your home to make it safer.    Talk to your doctor about the medicines you take. Ask if any of them increase the risk of falls and whether they can be changed or stopped.   Try to exercise regularly. It can help improve your strength and balance. This can help lower your risk of falling.         Practice fall safety and prevention.   Wear low-heeled shoes that fit well and give your feet good support. Talk to your doctor if you have foot problems that make this hard.  Carry a cellphone or wear a medical alert device that you can use to call for help.  Use stepladders instead of chairs to reach high objects. Don't climb if you're at risk for falls. Ask for help, if needed.  Wear the correct eyeglasses, if you need them.        Make your home safer.   Remove rugs, cords, clutter, and furniture from walkways.  Keep your house well lit. Use night-lights in hallways and bathrooms.  Install and use sturdy handrails on stairways.  Wear nonskid footwear, even inside. Don't walk barefoot or in socks without shoes.        Be safe outside.   Use handrails, curb cuts, and ramps whenever possible.  Keep your hands free by using a shoulder bag or backpack.  Try to walk in well-lit areas. Watch out for uneven ground, changes in pavement, and debris.  Be careful in the winter. Walk on the grass or gravel when sidewalks are slippery. Use de-icer on steps and walkways. Add  "non-slip devices to shoes.    Put grab bars and nonskid mats in your shower or tub and near the toilet. Try to use a shower chair or bath bench when bathing.   Get into a tub or shower by putting in your weaker leg first. Get out with your strong side first. Have a phone or medical alert device in the bathroom with you.   Where can you learn more?  Go to https://www.ScalIT.Jaree/patiented  Enter G117 in the search box to learn more about \"Preventing Falls: Care Instructions.\"  Current as of: July 31, 2024  Content Version: 14.5 2024-2025 [employer].   Care instructions adapted under license by your healthcare professional. If you have questions about a medical condition or this instruction, always ask your healthcare professional. [employer] disclaims any warranty or liability for your use of this information.    Relationships for Good Health  Relationships are important for our health and happiness. Social isolation, loneliness and lack of support are bad for your health. Studies show that loneliness can harm health and limit your life span as much as high blood pressure and smoking.   Take some time to reflect on your relationships. Then answer these questions:  Are there people in your life that cause you stress or drain your energy? What can you do to set limits?  ________________________________________________________________________________________________________________________________________________________________________________________________________________________________________________________________________________________________________________________________________________  Who do you enjoy spending time with? Who can you go to for " support?  ________________________________________________________________________________________________________________________________________________________________________________________________________________________________________________________________________________________________________________________________________________  What can you do to improve your relationships with others?  __________________________________________________________________________________________________________________________________________________________________________________________________________________  ______________________________________________________________________________________________________________________________  What do you like most about your relationships with others?  ________________________________________________________________________________________________________________________________________________________________________________________________________________________________________________________________________________________________________________________________________________  My goal: ______________________________________________________________________  I will: ______________________________________________________________________________________________________________________________________________________________________________________________    For informational purposes only. Not to replace the advice of your health care provider. Copyright   2018 [facility]. All rights reserved. Clinically reviewed by [facility]  Team. [employer] [id number] - Rev 06/24.  Learning About Stress  What is stress?     Stress is your body's response to a hard situation. Your body can have a physical, emotional, or mental response. Stress is a fact of life for most people, and it affects everyone differently. What causes stress for you may not be  stressful for someone else.  A lot of things can cause stress. You may feel stress when you go on a job interview, take a test, or run a race. This kind of short-term stress is normal and even useful. It can help you if you need to work hard or react quickly. For example, stress can help you finish an important job on time.  Long-term stress is caused by ongoing stressful situations or events. Examples of long-term stress include long-term health problems, ongoing problems at work, or conflicts in your family. Long-term stress can harm your health.  How does stress affect your health?  When you are stressed, your body responds as though you are in danger. It makes hormones that speed up your heart, make you breathe faster, and give you a burst of energy. This is called the fight-or-flight stress response. If the stress is over quickly, your body goes back to normal and no harm is done.  But if stress happens too often or lasts too long, it can have bad effects. Long-term stress can make you more likely to get sick, and it can make symptoms of some diseases worse. If you tense up when you are stressed, you may develop neck, shoulder, or low back pain. Stress is linked to high blood pressure and heart disease.  Stress also harms your emotional health. It can make you navarrete, tense, or depressed. Your relationships may suffer, and you may not do well at work or school.  What can you do to manage stress?  You can try these things to help manage stress:   Do something active. Exercise or activity can help reduce stress. Walking is a great way to get started. Even everyday activities such as housecleaning or yard work can help.  Try yoga or yenifer chi. These techniques combine exercise and meditation. You may need some training at first to learn them.  Do something you enjoy. For example, listen to music or go to a movie. Practice your hobby or do volunteer work.  Meditate. This can help you relax, because you are not  "worrying about what happened before or what may happen in the future.  Do guided imagery. Imagine yourself in any setting that helps you feel calm. You can use online videos, books, or a teacher to guide you.  Do breathing exercises. For example:  From a standing position, bend forward from the waist with your knees slightly bent. Let your arms dangle close to the floor.  Breathe in slowly and deeply as you return to a standing position. Roll up slowly and lift your head last.  Hold your breath for just a few seconds in the standing position.  Breathe out slowly and bend forward from the waist.  Let your feelings out. Talk, laugh, cry, and express anger when you need to. Talking with supportive friends or family, a counselor, or a kennedi leader about your feelings is a healthy way to relieve stress. Avoid discussing your feelings with people who make you feel worse.  Write. It may help to write about things that are bothering you. This helps you find out how much stress you feel and what is causing it. When you know this, you can find better ways to cope.  What can you do to prevent stress?  You might try some of these things to help prevent stress:  Manage your time. This helps you find time to do the things you want and need to do.  Get enough sleep. Your body recovers from the stresses of the day while you are sleeping.  Get support. Your family, friends, and community can make a difference in how you experience stress.  Limit your news feed. Avoid or limit time on social media or news that may make you feel stressed.  Do something active. Exercise or activity can help reduce stress. Walking is a great way to get started.  Where can you learn more?  Go to https://www.Marvel.net/patiented  Enter N032 in the search box to learn more about \"Learning About Stress.\"  Current as of: October 24, 2024  Content Version: 14.5 2024-2025 [facility].   Care instructions adapted under license by your " healthcare professional. If you have questions about a medical condition or this instruction, always ask your healthcare professional. [employer] disclaims any warranty or liability for your use of this information.    Learning About Depression Screening  What is depression screening?  Depression screening is a way to see if you have depression symptoms. It may be done by a doctor or counselor. It's often part of a routine checkup. That's because your mental health is just as important as your physical health.  Depression is a mental health condition that affects how you feel, think, and act. You may:  Have less energy.  Lose interest in your daily activities.  Feel sad and grouchy for a long time.  Depression is very common. It affects people of all ages.  Many things can lead to depression. Some people become depressed after they have a stroke or find out they have a major illness like cancer or heart disease. The death of a loved one or a breakup may lead to depression. It can run in families. Most experts believe that a combination of inherited genes and stressful life events can cause it.  What happens during screening?  You may be asked to fill out a form about your depression symptoms. You and the doctor will discuss your answers. The doctor may ask you more questions to learn more about how you think, act, and feel.  What happens after screening?  If you have symptoms of depression, your doctor will talk to you about your options.  Doctors usually treat depression with medicines or counseling. Often, combining the two works best. Many people don't get help because they think that they'll get over the depression on their own. But people with depression may not get better unless they get treatment.  The cause of depression is not well understood. There may be many factors involved. But if you have depression, it's not your fault.  A serious symptom of depression is thinking about death or suicide.  "If you or someone you care about talks about this or about feeling hopeless, get help right away.  It's important to know that depression can be treated. Medicine, counseling, and self-care may help.  Where can you learn more?  Go to https://www.TeeBeeDee.net/patiented  Enter T185 in the search box to learn more about \"Learning About Depression Screening.\"  Current as of: July 31, 2024  Content Version: 14.5    6651-9034 [employer].   Care instructions adapted under license by your healthcare professional. If you have questions about a medical condition or this instruction, always ask your healthcare professional. [employer] disclaims any warranty or liability for your use of this information.       "

## 2025-06-12 ENCOUNTER — PATIENT OUTREACH (OUTPATIENT)
Dept: CARE COORDINATION | Facility: CLINIC | Age: 60
End: 2025-06-12
Payer: COMMERCIAL

## 2025-06-12 ENCOUNTER — RESULTS FOLLOW-UP (OUTPATIENT)
Dept: FAMILY MEDICINE | Facility: CLINIC | Age: 60
End: 2025-06-12

## 2025-06-12 LAB
ALBUMIN SERPL BCG-MCNC: 4.4 G/DL (ref 3.5–5.2)
ALP SERPL-CCNC: 70 U/L (ref 40–150)
ALT SERPL W P-5'-P-CCNC: 37 U/L (ref 0–70)
ANION GAP SERPL CALCULATED.3IONS-SCNC: 9 MMOL/L (ref 7–15)
AST SERPL W P-5'-P-CCNC: 36 U/L (ref 0–45)
BACTERIA UR CULT: NO GROWTH
BILIRUB SERPL-MCNC: 0.4 MG/DL
BUN SERPL-MCNC: 11.2 MG/DL (ref 6–20)
CALCIUM SERPL-MCNC: 9.3 MG/DL (ref 8.8–10.4)
CHLORIDE SERPL-SCNC: 103 MMOL/L (ref 98–107)
CHOLEST SERPL-MCNC: 200 MG/DL
CREAT SERPL-MCNC: 0.98 MG/DL (ref 0.67–1.17)
EGFRCR SERPLBLD CKD-EPI 2021: 90 ML/MIN/1.73M2
FASTING STATUS PATIENT QL REPORTED: ABNORMAL
FASTING STATUS PATIENT QL REPORTED: ABNORMAL
GLUCOSE SERPL-MCNC: 112 MG/DL (ref 70–99)
HCO3 SERPL-SCNC: 26 MMOL/L (ref 22–29)
HDLC SERPL-MCNC: 42 MG/DL
LDLC SERPL CALC-MCNC: 132 MG/DL
NONHDLC SERPL-MCNC: 158 MG/DL
POTASSIUM SERPL-SCNC: 4.1 MMOL/L (ref 3.4–5.3)
PROT SERPL-MCNC: 6.8 G/DL (ref 6.4–8.3)
PSA SERPL DL<=0.01 NG/ML-MCNC: 1.68 NG/ML (ref 0–3.5)
SODIUM SERPL-SCNC: 138 MMOL/L (ref 135–145)
TRIGL SERPL-MCNC: 128 MG/DL

## 2025-06-30 ENCOUNTER — TELEPHONE (OUTPATIENT)
Dept: FAMILY MEDICINE | Facility: CLINIC | Age: 60
End: 2025-06-30
Payer: COMMERCIAL

## 2025-06-30 NOTE — TELEPHONE ENCOUNTER
General Call    Contacts       Contact Date/Time Type Contact Phone/Fax    06/30/2025 12:16 PM CDT Phone (Incoming) Larry Denson (Self) [phone] (M)          Reason for Call:  Neurology order from 06/11/2025 needs to be faxed over to the [facility] in [place] - Fax number is [phone]    What are your questions or concerns:  Order just needs to be faxed    Date of last appointment with provider: 06/11/2025    Could we send this information to you in [employer] or would you prefer to receive a phone call?:   Patient would prefer a phone call   Okay to leave a detailed message?: Yes at Cell number on file:    Telephone Information:   Mobile [phone]

## 2025-06-30 NOTE — TELEPHONE ENCOUNTER
Neuro referral faxed to [facility] @ [phone]     Shelby Manzano MA on 6/30/2025 at 1:07 PM

## 2025-07-07 LAB — NONINV COLON CA DNA+OCC BLD SCRN STL QL: NEGATIVE

## 2025-08-11 ENCOUNTER — TRANSFERRED RECORDS (OUTPATIENT)
Dept: HEALTH INFORMATION MANAGEMENT | Facility: CLINIC | Age: 60
End: 2025-08-11
Payer: COMMERCIAL